# Patient Record
Sex: MALE | Race: WHITE | NOT HISPANIC OR LATINO | Employment: FULL TIME | URBAN - METROPOLITAN AREA
[De-identification: names, ages, dates, MRNs, and addresses within clinical notes are randomized per-mention and may not be internally consistent; named-entity substitution may affect disease eponyms.]

---

## 2020-10-15 ENCOUNTER — TRANSFERRED RECORDS (OUTPATIENT)
Dept: HEALTH INFORMATION MANAGEMENT | Facility: CLINIC | Age: 60
End: 2020-10-15

## 2020-12-08 ENCOUNTER — TRANSFERRED RECORDS (OUTPATIENT)
Dept: HEALTH INFORMATION MANAGEMENT | Facility: CLINIC | Age: 60
End: 2020-12-08

## 2020-12-31 ENCOUNTER — TRANSFERRED RECORDS (OUTPATIENT)
Dept: HEALTH INFORMATION MANAGEMENT | Facility: CLINIC | Age: 60
End: 2020-12-31

## 2021-01-04 ENCOUNTER — TRANSFERRED RECORDS (OUTPATIENT)
Dept: HEALTH INFORMATION MANAGEMENT | Facility: CLINIC | Age: 61
End: 2021-01-04

## 2022-05-26 ENCOUNTER — TRANSFERRED RECORDS (OUTPATIENT)
Dept: HEALTH INFORMATION MANAGEMENT | Facility: CLINIC | Age: 62
End: 2022-05-26

## 2022-05-27 ENCOUNTER — TRANSFERRED RECORDS (OUTPATIENT)
Dept: HEALTH INFORMATION MANAGEMENT | Facility: CLINIC | Age: 62
End: 2022-05-27

## 2022-09-07 ENCOUNTER — TRANSFERRED RECORDS (OUTPATIENT)
Dept: HEALTH INFORMATION MANAGEMENT | Facility: CLINIC | Age: 62
End: 2022-09-07

## 2022-11-11 ENCOUNTER — TRANSFERRED RECORDS (OUTPATIENT)
Dept: HEALTH INFORMATION MANAGEMENT | Facility: CLINIC | Age: 62
End: 2022-11-11

## 2023-01-03 ENCOUNTER — TRANSFERRED RECORDS (OUTPATIENT)
Dept: HEALTH INFORMATION MANAGEMENT | Facility: CLINIC | Age: 63
End: 2023-01-03

## 2023-01-07 ENCOUNTER — TRANSFERRED RECORDS (OUTPATIENT)
Dept: HEALTH INFORMATION MANAGEMENT | Facility: CLINIC | Age: 63
End: 2023-01-07

## 2023-01-11 ENCOUNTER — TRANSFERRED RECORDS (OUTPATIENT)
Dept: HEALTH INFORMATION MANAGEMENT | Facility: CLINIC | Age: 63
End: 2023-01-11

## 2023-06-12 ENCOUNTER — TRANSFERRED RECORDS (OUTPATIENT)
Dept: HEALTH INFORMATION MANAGEMENT | Facility: CLINIC | Age: 63
End: 2023-06-12
Payer: COMMERCIAL

## 2023-07-23 ENCOUNTER — HEALTH MAINTENANCE LETTER (OUTPATIENT)
Age: 63
End: 2023-07-23

## 2023-10-12 ENCOUNTER — TELEPHONE (OUTPATIENT)
Dept: TRANSPLANT | Facility: CLINIC | Age: 63
End: 2023-10-12
Payer: COMMERCIAL

## 2023-10-12 DIAGNOSIS — N18.6 END STAGE RENAL DISEASE (H): ICD-10-CM

## 2023-10-12 DIAGNOSIS — I10 ESSENTIAL HYPERTENSION: ICD-10-CM

## 2023-10-12 DIAGNOSIS — Z01.818 PRE-TRANSPLANT EVALUATION FOR KIDNEY TRANSPLANT: Primary | ICD-10-CM

## 2023-10-12 DIAGNOSIS — E78.5 HYPERLIPIDEMIA: ICD-10-CM

## 2023-10-12 DIAGNOSIS — Z76.82 ORGAN TRANSPLANT CANDIDATE: ICD-10-CM

## 2023-10-12 DIAGNOSIS — N02.B9 IGA NEPHROPATHY: ICD-10-CM

## 2023-10-12 DIAGNOSIS — I25.10 CARDIOVASCULAR DISEASE: ICD-10-CM

## 2023-10-12 DIAGNOSIS — K76.89 LIVER DYSFUNCTION: ICD-10-CM

## 2023-10-12 NOTE — TELEPHONE ENCOUNTER
Patient Call: General    Reason for call: patient called stated he is down to 270 LB and also would like to see where he is at with moving to the next step. Caller has dialysis MWF early morning a good call return would be after 10:30 am.    Call back needed? Yes    Return Call Needed  Same as documented in contacts section  When to return call?: Greater than one day: Route standard priority

## 2023-11-13 NOTE — TELEPHONE ENCOUNTER
Contacted patient and introduced myself as their Transplant Coordinator, also introduced the role of the Transplant Coordinator in the transplant process.  Explained the purpose of this call including reviewing next steps and answering questions.    Confirmed Referring Provider, Dialysis Center, and Primary Care Physician. Notified patient of the importance of continued communication with referring providers and primary care physicians.    Reviewed components of transplant evaluation process including necessary appointments, tests, and procedures.    Answered questions for patient regarding evaluation, provided my name and contact information and requested they call with any additional questions.    Determined that patient would like additional information regarding transplant by:     Drop Down choices: Mail, Email, MyChart, Phone Call   Encourage MyChart   Notified patients that they will hear from a Transplant  to schedule evaluation.      Reviewed pt's chart for pre-kidney transplant evaluation planning. Pt lives in [place], [place]. Pt had severe ATN d/t sepsis from Cellulitis 9/2021 and was on HD for 3 months, then recovered, now has ESKD w/ 2016 renal biopsy showing: Mild mesangioproliferative IgA nephropathy. Acute tubular necrosis. Mild chronic tubulointerstitial nephritis. IgA Nephropathy Oxford Classification: M0, E0, S0, T0. Pt has been on dialysis since 1/2023 and follows w/ Dr. Anderson Davey.  Other hx includes GREG, DVT of LLE first noted 2014, had an IVC filter placed that has since been removed (DVT noted to be chronic on imaging 1/2023), pt is now on Eliquis, (transitioned off Coumadin d/t oozing internal hemorrhoids), portal HTN on Nadolol, portal vein thrombosis (s/p TIPS 2014), splenic artery embolization 2014, and gastric varices s/p banding (last EGD 1/7/23 unremarkable). Heart hx Cardiomyopathy noted in 2014 -last echo done in 2014.  BMI 41, pt continues to work on weight loss - discussed  BMI requirements.  Colonoscopy UTD.  Dental: UTD.  Pt is not a smoker, denies concerns w/ alcohol and  recreational drugs. Pt is independent w/ ADLs.  Pt lives w/ wife and has good support following transplant. Pt has potential living donors.     I also introduced [employer] and asked pt to create an account and view pre-kidney transplant videos for review with me following evaluation. Confirmed STD 2/8/24. Informed pt they will hear from scheduling to arrange the evaluation. Smartset orders entered.

## 2024-02-01 ENCOUNTER — TELEPHONE (OUTPATIENT)
Dept: TRANSPLANT | Facility: CLINIC | Age: 64
End: 2024-02-01
Payer: COMMERCIAL

## 2024-02-01 NOTE — TELEPHONE ENCOUNTER
Returned call to pt, he stated he had technical issues w/ the transplant class. I will email him the links to watch the videos independently and encouraged him to take a look prior to his PKE appointment, he agreed to do so.

## 2024-02-01 NOTE — TELEPHONE ENCOUNTER
Patient called tried to get into the virtual group meeting and was not able and missed it wondering what to do and how to get into the next one before his evaluation next week.

## 2024-02-01 NOTE — TELEPHONE ENCOUNTER
Called patient to try to reschedule him for class. Unable to reach him but did leave a VM letting him know to call Gracie tomorrow to get re scheduled for the virtual transplant class.

## 2024-02-05 ENCOUNTER — TELEPHONE (OUTPATIENT)
Dept: TRANSPLANT | Facility: CLINIC | Age: 64
End: 2024-02-05
Payer: COMMERCIAL

## 2024-02-05 NOTE — TELEPHONE ENCOUNTER
VM message left reminding patient of upcoming PKE appointments at [facility] on 2/8/24 starting at 0730. Instructed patient may eat breakfast and take regularly scheduled medications.  Contact information given for any further questions/concerns/need to reschedule.

## 2024-02-06 NOTE — PROGRESS NOTES
[facility] SOLID ORGAN TRANSPLANT  OUTPATIENT MNT: KIDNEY TRANSPLANT EVALUATION    Current BMI: 41 (HT 67 in,  lbs/119 kg)  BMI guideline for kidney transplant up to a BMI of 40 / per surgeon discretion     Frailty Assessment-- Not Frail (1/5 points)- low activity level     Reference:  Score of 0-2 = Not Frail  Score of 3-5 = Frail      TIME SPENT: 30 minutes  VISIT TYPE: Initial   REFERRING PHYSICIAN: Darian   PT ACCOMPANIED BY: his wife     History of previous txp: none   Dialysis: 1/2023 early AM shift     NUTRITION ASSESSMENT  - Appetite: overall ok, but less the past few days (pt reports it fluctuates based on weather)  - Food allergies/intolerances: none   - Meal prep & grocery shopping: pt or his wife   - Previous RD education: yes  - Issues chewing or swallowing: no   - N/V/D/C: no  - Food access concerns: not asked     Vitamins, Supplements, Pertinent Meds: dialyvite, iron, Phoslo (takes w/ food)  Herbal Medicines/Supplements: none   Protein Supplement: none     Edema: wears compression socks, so fluid well controlled (mild MARJORIE before starting dialysis)    Weight hx:   - pt has lost ~70 lbs in the past year- pt attributes this to following the cabbage soup diet and eating more salads now that he switched from coumadin to eliquis; likely weight loss in part d/t diuresis from dialysis start   - feels his weight continues to trend down     6/2022- 332 lbs (BMI 52)  9/2022- 308 lbs (BMI 46.8)  1/2023- 302 lbs (BMI 45.9)    PHYSICAL ACTIVITY   Moves around/walks at work   Has total gym at home- but does not use routinely  Does own ADLs- energy level good  Can walk 1 block    DIET RECALL  Breakfast Light BF- PB s/w (1/2) after HD or on break will have oatmeal w/ raisins, apples (meal preps)   Lunch Fruit cup, boiled eggs, some deli turkey s/w, leftovers       Dinner Tater tot hot dish + salad; egg salad s/w    Snacks Unsalted peanuts    Beverages Water, coffee (black), German Garza (if out to  eat- or lite version at home), crystal light tea sometimes, occasional juice, occasional Diet pop (1/week or less), 8 oz milk/day (white or chocolate)   Alcohol None    Dining out 1x/week or less      LABS  1/15 Phos 4.4 K 4.5     NUTRITION DIAGNOSIS   Obesity r/t positive energy balance and inadequate physical activity AEB BMI>40.     NUTRITION INTERVENTION   Nutrition education provided:  Discussed sodium intake (low sodium foods and drinks, seasoning food without salt and tips for low sodium diet).  Reviewed monitoring Na intake in foods that are processed and how this may impact cramping at dialysis, etc. Reviewed wnl K/Phos levels. Pt is wondering if he can drink Ensure Plus (received case from dad)- yes, this would be appropriate with wnl K/Phos levels. Advise only 3x/week due to calorie content. Would recommend using in place of sandwich after dialysis tx.   Reviewed likelihood he will need to lose a little more weight; TBD by surgeon.     Reviewed post txp diet guidelines in brief (will review in further detail post txp):  (1) Review of proper food safety measures d/t immunosuppressant therapy post-op and increased risk for food-borne illness    (2) Avoid the following post txp d/t risk for rejection, unknown effects on the organs, and/or potential interactions with immunosuppressants:  - Herbal, Chinese, holistic, chiropractic, natural, alternative medicines and supplements  - Detoxes and cleanses  - Weight loss pills  - Protein powders or other products with extracts or herbs (ie green tea extract)    (3) Med regimen and possible side effects    Patient Understanding: Pt verbalized understanding of education provided.  Expected Engagement: Good  Follow-Up Plans: PRN     NUTRITION GOALS    BMI/weight loss goals per surgeon    Jordyn Saldivar, RD, LD, CCTD

## 2024-02-07 LAB
ABO/RH(D): NORMAL
ANTIBODY SCREEN: NEGATIVE
SPECIMEN EXPIRATION DATE: NORMAL

## 2024-02-08 ENCOUNTER — LAB (OUTPATIENT)
Dept: LAB | Facility: CLINIC | Age: 64
End: 2024-02-08
Attending: NURSE PRACTITIONER
Payer: COMMERCIAL

## 2024-02-08 ENCOUNTER — ALLIED HEALTH/NURSE VISIT (OUTPATIENT)
Dept: TRANSPLANT | Facility: CLINIC | Age: 64
End: 2024-02-08
Attending: NURSE PRACTITIONER
Payer: COMMERCIAL

## 2024-02-08 ENCOUNTER — ANCILLARY PROCEDURE (OUTPATIENT)
Dept: CARDIOLOGY | Facility: CLINIC | Age: 64
End: 2024-02-08
Attending: NURSE PRACTITIONER
Payer: COMMERCIAL

## 2024-02-08 ENCOUNTER — ANCILLARY PROCEDURE (OUTPATIENT)
Dept: GENERAL RADIOLOGY | Facility: CLINIC | Age: 64
End: 2024-02-08
Attending: NURSE PRACTITIONER
Payer: COMMERCIAL

## 2024-02-08 ENCOUNTER — DOCUMENTATION ONLY (OUTPATIENT)
Dept: TRANSPLANT | Facility: CLINIC | Age: 64
End: 2024-02-08

## 2024-02-08 VITALS
HEART RATE: 90 BPM | BODY MASS INDEX: 41.14 KG/M2 | DIASTOLIC BLOOD PRESSURE: 62 MMHG | HEIGHT: 67 IN | WEIGHT: 262.1 LBS | OXYGEN SATURATION: 97 % | SYSTOLIC BLOOD PRESSURE: 166 MMHG

## 2024-02-08 DIAGNOSIS — E78.5 HYPERLIPIDEMIA: ICD-10-CM

## 2024-02-08 DIAGNOSIS — N02.B9 IGA NEPHROPATHY: ICD-10-CM

## 2024-02-08 DIAGNOSIS — K76.89 LIVER DYSFUNCTION: ICD-10-CM

## 2024-02-08 DIAGNOSIS — N18.6 END STAGE RENAL DISEASE (H): ICD-10-CM

## 2024-02-08 DIAGNOSIS — I25.10 CARDIOVASCULAR DISEASE: ICD-10-CM

## 2024-02-08 DIAGNOSIS — I10 ESSENTIAL HYPERTENSION: ICD-10-CM

## 2024-02-08 DIAGNOSIS — R31.29 MICROSCOPIC HEMATURIA: ICD-10-CM

## 2024-02-08 DIAGNOSIS — Z76.82 ORGAN TRANSPLANT CANDIDATE: ICD-10-CM

## 2024-02-08 DIAGNOSIS — Z01.818 PRE-TRANSPLANT EVALUATION FOR KIDNEY TRANSPLANT: ICD-10-CM

## 2024-02-08 DIAGNOSIS — Z01.818 PRE-TRANSPLANT EVALUATION FOR KIDNEY TRANSPLANT: Primary | ICD-10-CM

## 2024-02-08 LAB
A1 AG RBC QL: POSITIVE
ABO/RH(D): NORMAL
ALBUMIN SERPL BCG-MCNC: 4.5 G/DL (ref 3.5–5.2)
ALBUMIN UR-MCNC: 20 MG/DL
ALP SERPL-CCNC: 71 U/L (ref 40–150)
ALT SERPL W P-5'-P-CCNC: 16 U/L (ref 0–70)
ANION GAP SERPL CALCULATED.3IONS-SCNC: 16 MMOL/L (ref 7–15)
ANTIBODY TITER IGM SCREEN: NEGATIVE
APPEARANCE UR: ABNORMAL
APTT PPP: 38 SECONDS (ref 22–38)
AST SERPL W P-5'-P-CCNC: 28 U/L (ref 0–45)
B IGG TITR SERPL: 16 {TITER}
B IGM TITR SERPL: 8 {TITER}
BASOPHILS # BLD AUTO: 0 10E3/UL (ref 0–0.2)
BASOPHILS NFR BLD AUTO: 1 %
BILIRUB SERPL-MCNC: 0.9 MG/DL
BILIRUB UR QL STRIP: NEGATIVE
BUN SERPL-MCNC: 38.7 MG/DL (ref 8–23)
CALCIUM SERPL-MCNC: 10 MG/DL (ref 8.8–10.2)
CHLORIDE SERPL-SCNC: 96 MMOL/L (ref 98–107)
CMV IGG SERPL IA-ACNC: >10 U/ML
CMV IGG SERPL IA-ACNC: ABNORMAL
COLOR UR AUTO: YELLOW
CREAT SERPL-MCNC: 4.36 MG/DL (ref 0.67–1.17)
DEPRECATED HCO3 PLAS-SCNC: 26 MMOL/L (ref 22–29)
EBV VCA IGG SER IA-ACNC: >750 U/ML
EBV VCA IGG SER IA-ACNC: POSITIVE
EGFRCR SERPLBLD CKD-EPI 2021: 15 ML/MIN/1.73M2
EOSINOPHIL # BLD AUTO: 0.1 10E3/UL (ref 0–0.7)
EOSINOPHIL NFR BLD AUTO: 1 %
ERYTHROCYTE [DISTWIDTH] IN BLOOD BY AUTOMATED COUNT: 13.6 % (ref 10–15)
FACTOR 2 INTERPRETATION: NORMAL
FACTOR V INTERPRETATION: NORMAL
GLUCOSE SERPL-MCNC: 137 MG/DL (ref 70–99)
GLUCOSE UR STRIP-MCNC: NEGATIVE MG/DL
HBA1C MFR BLD: 6 %
HCT VFR BLD AUTO: 39.8 % (ref 40–53)
HGB BLD-MCNC: 13.8 G/DL (ref 13.3–17.7)
HGB UR QL STRIP: ABNORMAL
HYALINE CASTS: 10 /LPF
IMM GRANULOCYTES # BLD: 0 10E3/UL
IMM GRANULOCYTES NFR BLD: 0 %
INR PPP: 1.32 (ref 0.85–1.15)
KETONES UR STRIP-MCNC: NEGATIVE MG/DL
LAB DIRECTOR COMMENTS: NORMAL
LAB DIRECTOR DISCLAIMER: NORMAL
LAB DIRECTOR INTERPRETATION: NORMAL
LAB DIRECTOR METHODOLOGY: NORMAL
LAB DIRECTOR RESULTS: NORMAL
LEUKOCYTE ESTERASE UR QL STRIP: NEGATIVE
LVEF ECHO: NORMAL
LYMPHOCYTES # BLD AUTO: 1 10E3/UL (ref 0.8–5.3)
LYMPHOCYTES NFR BLD AUTO: 19 %
MCH RBC QN AUTO: 31.2 PG (ref 26.5–33)
MCHC RBC AUTO-ENTMCNC: 34.7 G/DL (ref 31.5–36.5)
MCV RBC AUTO: 90 FL (ref 78–100)
MONOCYTES # BLD AUTO: 0.5 10E3/UL (ref 0–1.3)
MONOCYTES NFR BLD AUTO: 10 %
MUCOUS THREADS #/AREA URNS LPF: PRESENT /LPF
NEUTROPHILS # BLD AUTO: 3.5 10E3/UL (ref 1.6–8.3)
NEUTROPHILS NFR BLD AUTO: 69 %
NITRATE UR QL: NEGATIVE
NRBC # BLD AUTO: 0 10E3/UL
NRBC BLD AUTO-RTO: 0 /100
PH UR STRIP: 5 [PH] (ref 5–7)
PLATELET # BLD AUTO: 123 10E3/UL (ref 150–450)
POTASSIUM SERPL-SCNC: 3.6 MMOL/L (ref 3.4–5.3)
PROT SERPL-MCNC: 8.2 G/DL (ref 6.4–8.3)
PSA SERPL DL<=0.01 NG/ML-MCNC: 1.14 NG/ML (ref 0–4.5)
RBC # BLD AUTO: 4.43 10E6/UL (ref 4.4–5.9)
RBC URINE: >182 /HPF
SODIUM SERPL-SCNC: 138 MMOL/L (ref 135–145)
SP GR UR STRIP: 1.02 (ref 1–1.03)
SPECIMEN DESCRIPTION: NORMAL
SPECIMEN EXPIRATION DATE: NORMAL
SQUAMOUS EPITHELIAL: 1 /HPF
T PALLIDUM AB SER QL: NONREACTIVE
UROBILINOGEN UR STRIP-MCNC: NORMAL MG/DL
VZV IGG SER QL IA: 3576 INDEX
VZV IGG SER QL IA: POSITIVE
WBC # BLD AUTO: 5.1 10E3/UL (ref 4–11)
WBC URINE: 6 /HPF

## 2024-02-08 PROCEDURE — 86147 CARDIOLIPIN ANTIBODY EA IG: CPT | Performed by: PHYSICIAN ASSISTANT

## 2024-02-08 PROCEDURE — 86905 BLOOD TYPING RBC ANTIGENS: CPT | Performed by: PHYSICIAN ASSISTANT

## 2024-02-08 PROCEDURE — 86787 VARICELLA-ZOSTER ANTIBODY: CPT | Performed by: PHYSICIAN ASSISTANT

## 2024-02-08 PROCEDURE — G0103 PSA SCREENING: HCPCS | Performed by: PATHOLOGY

## 2024-02-08 PROCEDURE — 86481 TB AG RESPONSE T-CELL SUSP: CPT | Performed by: PHYSICIAN ASSISTANT

## 2024-02-08 PROCEDURE — 86832 HLA CLASS I HIGH DEFIN QUAL: CPT | Performed by: PHYSICIAN ASSISTANT

## 2024-02-08 PROCEDURE — 81378 HLA I & II TYPING HR: CPT | Performed by: PHYSICIAN ASSISTANT

## 2024-02-08 PROCEDURE — 85610 PROTHROMBIN TIME: CPT | Performed by: PHYSICIAN ASSISTANT

## 2024-02-08 PROCEDURE — 86704 HEP B CORE ANTIBODY TOTAL: CPT | Performed by: PHYSICIAN ASSISTANT

## 2024-02-08 PROCEDURE — 86886 COOMBS TEST INDIRECT TITER: CPT | Performed by: PHYSICIAN ASSISTANT

## 2024-02-08 PROCEDURE — 86665 EPSTEIN-BARR CAPSID VCA: CPT | Performed by: PHYSICIAN ASSISTANT

## 2024-02-08 PROCEDURE — 85025 COMPLETE CBC W/AUTO DIFF WBC: CPT | Performed by: PATHOLOGY

## 2024-02-08 PROCEDURE — 99213 OFFICE O/P EST LOW 20 MIN: CPT | Performed by: TRANSPLANT SURGERY

## 2024-02-08 PROCEDURE — 86706 HEP B SURFACE ANTIBODY: CPT | Performed by: PHYSICIAN ASSISTANT

## 2024-02-08 PROCEDURE — 93306 TTE W/DOPPLER COMPLETE: CPT | Performed by: INTERNAL MEDICINE

## 2024-02-08 PROCEDURE — 86644 CMV ANTIBODY: CPT | Performed by: PHYSICIAN ASSISTANT

## 2024-02-08 PROCEDURE — 36415 COLL VENOUS BLD VENIPUNCTURE: CPT | Performed by: PATHOLOGY

## 2024-02-08 PROCEDURE — 87086 URINE CULTURE/COLONY COUNT: CPT | Performed by: PHYSICIAN ASSISTANT

## 2024-02-08 PROCEDURE — 81001 URINALYSIS AUTO W/SCOPE: CPT | Performed by: PATHOLOGY

## 2024-02-08 PROCEDURE — 80053 COMPREHEN METABOLIC PANEL: CPT | Performed by: PATHOLOGY

## 2024-02-08 PROCEDURE — 99000 SPECIMEN HANDLING OFFICE-LAB: CPT | Performed by: PATHOLOGY

## 2024-02-08 PROCEDURE — 99204 OFFICE O/P NEW MOD 45 MIN: CPT | Performed by: TRANSPLANT SURGERY

## 2024-02-08 PROCEDURE — 86803 HEPATITIS C AB TEST: CPT | Performed by: PHYSICIAN ASSISTANT

## 2024-02-08 PROCEDURE — 71046 X-RAY EXAM CHEST 2 VIEWS: CPT | Mod: GC | Performed by: RADIOLOGY

## 2024-02-08 PROCEDURE — 85670 THROMBIN TIME PLASMA: CPT | Performed by: PHYSICIAN ASSISTANT

## 2024-02-08 PROCEDURE — 86900 BLOOD TYPING SEROLOGIC ABO: CPT | Performed by: PHYSICIAN ASSISTANT

## 2024-02-08 PROCEDURE — 87340 HEPATITIS B SURFACE AG IA: CPT | Performed by: PHYSICIAN ASSISTANT

## 2024-02-08 PROCEDURE — 86833 HLA CLASS II HIGH DEFIN QUAL: CPT | Performed by: PHYSICIAN ASSISTANT

## 2024-02-08 PROCEDURE — 86780 TREPONEMA PALLIDUM: CPT | Performed by: PHYSICIAN ASSISTANT

## 2024-02-08 PROCEDURE — G0452 MOLECULAR PATHOLOGY INTERPR: HCPCS | Mod: 26 | Performed by: PATHOLOGY

## 2024-02-08 PROCEDURE — 81240 F2 GENE: CPT | Performed by: PHYSICIAN ASSISTANT

## 2024-02-08 PROCEDURE — 85730 THROMBOPLASTIN TIME PARTIAL: CPT | Performed by: PHYSICIAN ASSISTANT

## 2024-02-08 PROCEDURE — 83036 HEMOGLOBIN GLYCOSYLATED A1C: CPT | Performed by: PHYSICIAN ASSISTANT

## 2024-02-08 RX ORDER — APIXABAN 2.5 MG/1
1 TABLET, FILM COATED ORAL 2 TIMES DAILY
COMMUNITY
Start: 2023-01-13

## 2024-02-08 RX ORDER — CALCIUM ACETATE 667 MG/1
667 CAPSULE ORAL
COMMUNITY
Start: 2023-01-03

## 2024-02-08 RX ORDER — ASCORBIC ACID 500 MG
500 TABLET ORAL DAILY
COMMUNITY
End: 2024-02-20

## 2024-02-08 RX ORDER — ASCORBIC ACID, THIAMINE, RIBOFLAVIN, NIACINAMIDE, PYRIDOXINE, FOLIC ACID, COBALAMIN, BIOTIN, PANTOTHENIC ACID, ZINC 100; 1.5; 1.7; 20; 10; 1; 6; 300; 10; 5 MG/1; MG/1; MG/1; MG/1; MG/1; MG/1; UG/1; UG/1; MG/1; MG/1
1 TABLET, COATED ORAL EVERY MORNING
COMMUNITY
Start: 2024-01-22

## 2024-02-08 NOTE — LETTER
2/8/2024         RE: Bao Keenan  [address] [address] [address]        Dear Colleague,    Thank you for referring your patient, Bao Keenan, to the [facility] TRANSPLANT [facility]. Please see a copy of my visit note below.    [facility] SOLID ORGAN TRANSPLANT  OUTPATIENT MNT: KIDNEY TRANSPLANT EVALUATION    Current BMI: 41 (HT 67 in,  lbs/119 kg)  BMI guideline for kidney transplant up to a BMI of 40 / per surgeon discretion     Frailty Assessment-- Not Frail (1/5 points)- low activity level     Reference:  Score of 0-2 = Not Frail  Score of 3-5 = Frail      TIME SPENT: 30 minutes  VISIT TYPE: Initial   REFERRING PHYSICIAN: Darian   PT ACCOMPANIED BY: his wife     History of previous txp: none   Dialysis: 1/2023 early AM shift     NUTRITION ASSESSMENT  - Appetite: overall ok, but less the past few days (pt reports it fluctuates based on weather)  - Food allergies/intolerances: none   - Meal prep & grocery shopping: pt or his wife   - Previous RD education: yes  - Issues chewing or swallowing: no   - N/V/D/C: no  - Food access concerns: not asked     Vitamins, Supplements, Pertinent Meds: dialyvite, iron, Phoslo (takes w/ food)  Herbal Medicines/Supplements: none   Protein Supplement: none     Edema: wears compression socks, so fluid well controlled (mild MARJORIE before starting dialysis)    Weight hx:   - pt has lost ~70 lbs in the past year- pt attributes this to following the cabbage soup diet and eating more salads now that he switched from coumadin to eliquis; likely weight loss in part d/t diuresis from dialysis start   - feels his weight continues to trend down     6/2022- 332 lbs (BMI 52)  9/2022- 308 lbs (BMI 46.8)  1/2023- 302 lbs (BMI 45.9)    PHYSICAL ACTIVITY   Moves around/walks at work   Has total gym at home- but does not use routinely  Does own ADLs- energy level good  Can walk 1 block    DIET RECALL  Breakfast Light BF- PB s/w (1/2) after HD or on break will  have oatmeal w/ raisins, apples (meal preps)   Lunch Fruit cup, boiled eggs, some deli turkey s/w, leftovers       Dinner Tater tot hot dish + salad; egg salad s/w    Snacks Unsalted peanuts    Beverages Water, coffee (black), German Garza (if out to eat- or lite version at home), crystal light tea sometimes, occasional juice, occasional Diet pop (1/week or less), 8 oz milk/day (white or chocolate)   Alcohol None    Dining out 1x/week or less      LABS  1/15 Phos 4.4 K 4.5     NUTRITION DIAGNOSIS   Obesity r/t positive energy balance and inadequate physical activity AEB BMI>40.     NUTRITION INTERVENTION   Nutrition education provided:  Discussed sodium intake (low sodium foods and drinks, seasoning food without salt and tips for low sodium diet).  Reviewed monitoring Na intake in foods that are processed and how this may impact cramping at dialysis, etc. Reviewed wnl K/Phos levels. Pt is wondering if he can drink Ensure Plus (received case from dad)- yes, this would be appropriate with wnl K/Phos levels. Advise only 3x/week due to calorie content. Would recommend using in place of sandwich after dialysis tx.   Reviewed likelihood he will need to lose a little more weight; TBD by surgeon.     Reviewed post txp diet guidelines in brief (will review in further detail post txp):  (1) Review of proper food safety measures d/t immunosuppressant therapy post-op and increased risk for food-borne illness    (2) Avoid the following post txp d/t risk for rejection, unknown effects on the organs, and/or potential interactions with immunosuppressants:  - Herbal, Chinese, holistic, chiropractic, natural, alternative medicines and supplements  - Detoxes and cleanses  - Weight loss pills  - Protein powders or other products with extracts or herbs (ie green tea extract)    (3) Med regimen and possible side effects    Patient Understanding: Pt verbalized understanding of education provided.  Expected Engagement: Good  Follow-Up  Plans: PRN     NUTRITION GOALS    BMI/weight loss goals per surgeon    Jordyn Saldivar RD, LD, CCTD                                      Again, thank you for allowing me to participate in the care of your patient.        Sincerely,        Jordyn Saldivar RD

## 2024-02-08 NOTE — PROGRESS NOTES
"Patient has end-stage renal disease and here to assess candidacy for renal transplantation.    Please see my nephrology colleague notes.    Briefly:  Patient has end-stage renal disease is currently on dialysis.  Over the last year he has lost about 70 pounds.  He has also lost some fluid due to dialysis.  He switched over from warfarin to Eliquis for his hypercoagulability and his diet has improved and is now taking more salads.  He is otherwise quite functional.  He is working 40 hours a week and dialyzing 3 times a week.  No recent chest pain heart attack or any other symptoms.    BP (!) 166/62   Pulse 90   Ht 1.702 m (5' 7\")   Wt 118.9 kg (262 lb 1.6 oz)   SpO2 97%   BMI 41.05 kg/m      Examination of the abdomen: He has got significant pannus present.  The right femoral pulses weakly felt.    Clinical impression: End-stage renal disease.    Recommendations:    #1.  He needs a hepatology consultation.  In the past he has had portal vein thrombosis with varices and looks like he met with interventional radiology but I cannot find the notes as to the current situation.  Would for start with a ultrasound Doppler to evaluate the patency of the portal vein and then a clearance from hepatology as to the etiology of the portal vein thrombosis.  #2.  He should continue to lose weight.  At this time weight is not a contraindication and he can move ahead with kidney transplant if everything else works out.  #3.  He needs CT scan of the abdomen without any IV contrast to assess for any calcifications.    I had a long discussion with the patient regarding kidney transplantation in general and the following points in particular:    Survival statistics at one, five and ten years following kidney transplantation both for living-related and cadaveric allografts.  The kidney transplant selection committee process.  The complications following kidney transplant that included but were not limited to wound infection, vascular " complications, ureter leak, ureteral strictures, and bowel obstruction  The need for lifelong immunosuppressive therapy and the side effects of these medications including specifically the risk of cancer and lymphoma.  The waiting list time of approximately a year or more for cadaveric transplants.  The statistical superiority of a living-related donor and the compelling reasons to encourage that therapy.    The patient understands these issues quite well and is eager to proceed with our recommendation and with transplantation.

## 2024-02-08 NOTE — LETTER
2/8/2024         RE: Bao Keenan  [address] [address] [facility] [facility] [address]        Dear Colleague,    Thank you for referring your patient, Bao Keenan, to the [facility] TRANSPLANT [facility]. Please see a copy of my visit note below.    TRANSPLANT NEPHROLOGY RECIPIENT EVALUATION NOTE    Assessment and Plan:  # Kidney Transplant Evaluation: Patient is a fair candidate overall. Benefits of a living donor transplant were discussed.    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    # Protein C Deficiency with Recurrent DVT: multiple episodes, including portal vein thrombosis, as below. He has been anticoagulated since about 2009, history of IVC filter, since removed. On Eliquis currently at patient's request, but was previously on long-term coumadin and would be open to going back on it if needed.    # Portal HTN/Gastropathy (on most recent 2022 EGD)  # Portal Vein Thrombosis  # Esophageal Varices (s/p banding 2014)    2/2 protein C deficiency  - Partial splenic artery embolization, venous embolization of gastric varices and splenorenal shunts. Platelets ~120k. Needs hepatology.     # Microscopic Hematuria: previous UAs with ~6-10 RBCs, but now > 100. will refer to urology.     # Prediabetes: A1c 6%. Follow up with PCP.     # Cardiac Risk: history of cardiomyopathy of unclear etiology with EF 40-45% in 2014 (since recovered) and 1st degree AV block. Needs risk assessment.      # PAD Screening: per surgery    # Obesity: BMI 41. Has lost about 70 lbs in the last year. Surgeon input regarding appropriateness of body habitus for transplant.    # Normal Hemoglobin: will get renal US to assess for mass.     # Health Maintenance: Colonoscopy: Up to date and Dental: Up to date    Recommendations:  - Review Eliquis. May need to transition back to coumadin for transplant   - Referral to hepatology  - Cardiac risk assessment  - PAD imaging per surgery  -  Review BMI  - Renal US    - Discussed the risks and benefits of a transplant, including the risk of surgery and immunosuppression medications.  Patient's overall evaluation will be discussed in the Transplant Program's regular meeting with a final recommendation on the patients suitability for transplant to be made at that time.    Evaluation:  Bao Keenan was seen in consultation at the request of Dr. Hussain Farmer for evaluation as a potential kidney transplant recipient.    Reason for Visit:  Bao Keenan is a 63 year old male with ESKD from IgA nephropathy, who presents for kidney transplant evaluation.    History of Present Illness:         Kidney Disease Hx:        Native kidney biopsy 2016 with IgA nephropathy ([id number]), creatinine mid-to-high 1's mg/dl. Serologic evaluation at that time in 2016 was negative (WESTLEY, ANCA, hepatitis C antibody, hep B surface antigen, and SPEP). Severe FELICIANO 2020 from sepsis 2/2 cellulitis, required HD 10/8/2020-1/11/2021. FELICIANO May 2022, severe near life threatening bleed with EGD showing portal gastropathy. HD started Jan 2023 during admit for GI bleed. HD is going OK. Still has UO. LUE AVF.        Primary Nephrologist: Dr. Rodriguez       H/o Kidney Stones: No       H/o Recurrent/Frequent UTI: No         Diabetic Hx: Borderline            Cardiac/Vascular Disease Risk Factors:        Known CAD: No       Known PAD/Caludication Symptoms: No       Known Heart Failure: Decreased LVEF       Arrhythmia: No       Pulmonary Hypertension: No       Valvular Disease: No       Other: None         Viral Serology Status       CMV IgG Antibody: Positive       EBV IgG Antibody: Positive         Volume Status/Weight:        Volume status: Euvolemic       BMI: 41         Functional Capacity/Frailty:        Works full time for medical device company. Not too much exercise but no limitations. Can walk at least a block. No chest pain or SOB.    Fatigue/Decreased Energy: [x] No [] Yes     Chest Pain or SOB with Exertion: [x] No [] Yes    Significant Weight Change: [x] No [] Yes    Nausea, Vomiting or Diarrhea: [x] No [] Yes    Fever, Sweats or Chills:  [x] No [] Yes    Leg Swelling [x] No [] Yes        Allergy Testing Questions:   Medication that caused a reaction None   Antibiotics used that didn't give an allergic reaction?  None    Potential Living Kidney Donors: Yes    Review of Systems:  A comprehensive review of systems was obtained and negative, except as noted in the HPI or PMH.    Past Medical History:   Medical record was reviewed and PMH was discussed with patient and noted below.  Past Medical History:   Diagnosis Date     Anemia      Antiplatelet or antithrombotic long-term use      Arthritis     Right Knee     Coagulation disorder (H24)     Protein C deficiency     Gastro-oesophageal reflux disease      History of blood transfusion      Hypertension      Liver disease     non alcoholic fatty liver     Portal hypertension (H)      Renal disease     CKD     Thrombosis of leg     +DVT       Past Social History:   Past Surgical History:   Procedure Laterality Date     BIOPSY      2014     COLONOSCOPY       ENT SURGERY      tonsils     EYE SURGERY      lasik     IR IVC FILTER PLACEMENT       IR IVC FILTER REMOVAL  12/17/2014     TIPS PROCEDURE       Personal history of bleeding or anesthesia problems: No    Family History:  No family history on file.    Personal History:   Social History     Socioeconomic History     Marital status:      Spouse name: Not on file     Number of children: Not on file     Years of education: Not on file     Highest education level: Not on file   Occupational History     Not on file   Tobacco Use     Smoking status: Never     Smokeless tobacco: Never   Substance and Sexual Activity     Alcohol use: No     Drug use: No     Sexual activity: Not on file   Other Topics Concern     Parent/sibling w/ CABG, MI or angioplasty before 65F 55M? Not Asked   Social  "History Narrative     Not on file     Social Determinants of Health     Financial Resource Strain: Not on file   Food Insecurity: Not on file   Transportation Needs: Not on file   Physical Activity: Not on file   Stress: Not on file   Social Connections: Not on file   Interpersonal Safety: Not on file   Housing Stability: Not on file       Allergies:  No Known Allergies    Medications:  Current Outpatient Medications   Medication Sig     B Complex-C-Zn-Folic Acid (DIALYVITE/ZINC) TABS Take 1 tablet by mouth every morning     calcium acetate (PHOSLO) 667 MG CAPS capsule calcium acetate(phosphat bind) 667 mg     ELIQUIS ANTICOAGULANT 2.5 MG tablet Take 1 tablet by mouth     Ferrous Sulfate (IRON SUPPLEMENT PO) Take 325 mg by mouth 2 times daily (with meals)      rosuvastatin (CRESTOR) 5 MG tablet Take 5 mg by mouth     No current facility-administered medications for this visit.       Vitals:   2/8/2024  7:45 AM   Vital Signs    Systolic 166 !    Diastolic 62 !    Pulse 90    Temperature    Respirations    Weight (LB) 262 lb 1.6 oz    Height 5' 7\"    BMI (Calculated) 41.05    Pain Score    O2 97 %       Legend:  ! Abnormal    Exam:  GENERAL APPEARANCE: alert and no distress  HENT: mouth without ulcers or lesions, good dentition  RESP: lungs clear to auscultation - no rales, rhonchi or wheezes  CV: regular rhythm, normal rate, no rub, no murmur  EDEMA: no LE edema bilaterally  ABDOMEN: soft, nondistended, nontender, central obesity  MS: extremities normal - no gross deformities noted, no evidence of inflammation in joints, no muscle tenderness  SKIN: no rash    Results:   No results found for this or any previous visit (from the past 336 hour(s)).          Again, thank you for allowing me to participate in the care of your patient.        Sincerely,        Briseida Santoyo PA-C  "

## 2024-02-08 NOTE — LETTER
"    2/8/2024         RE: Bao Keenan  [address] [facility] [address]        Dear Colleague,    Thank you for referring your patient, Bao Keenan, to the [facility] TRANSPLANT [facility]IN[facility]. Please see a copy of my visit note below.    Patient has end-stage renal disease and here to assess candidacy for renal transplantation.    Please see my nephrology colleague notes.    Briefly:  Patient has end-stage renal disease is currently on dialysis.  Over the last year he has lost about 70 pounds.  He has also lost some fluid due to dialysis.  He switched over from warfarin to Eliquis for his hypercoagulability and his diet has improved and is now taking more salads.  He is otherwise quite functional.  He is working 40 hours a week and dialyzing 3 times a week.  No recent chest pain heart attack or any other symptoms.    BP (!) 166/62   Pulse 90   Ht 1.702 m (5' 7\")   Wt 118.9 kg (262 lb 1.6 oz)   SpO2 97%   BMI 41.05 kg/m      Examination of the abdomen: He has got significant pannus present.  The right femoral pulses weakly felt.    Clinical impression: End-stage renal disease.    Recommendations:    #1.  He needs a hepatology consultation.  In the past he has had portal vein thrombosis with varices and looks like he met with interventional radiology but I cannot find the notes as to the current situation.  Would for start with a ultrasound Doppler to evaluate the patency of the portal vein and then a clearance from hepatology as to the etiology of the portal vein thrombosis.  #2.  He should continue to lose weight.  At this time weight is not a contraindication and he can move ahead with kidney transplant if everything else works out.  #3.  He needs CT scan of the abdomen without any IV contrast to assess for any calcifications.    I had a long discussion with the patient regarding kidney transplantation in general and the following points in particular:    Survival statistics at one, five and ten " years following kidney transplantation both for living-related and cadaveric allografts.  The kidney transplant selection committee process.  The complications following kidney transplant that included but were not limited to wound infection, vascular complications, ureter leak, ureteral strictures, and bowel obstruction  The need for lifelong immunosuppressive therapy and the side effects of these medications including specifically the risk of cancer and lymphoma.  The waiting list time of approximately a year or more for cadaveric transplants.  The statistical superiority of a living-related donor and the compelling reasons to encourage that therapy.    The patient understands these issues quite well and is eager to proceed with our recommendation and with transplantation.      Again, thank you for allowing me to participate in the care of your patient.        Sincerely,        Hussain Farmer MD

## 2024-02-08 NOTE — LETTER
2/8/2024         RE: Bao Keenan  [address] [facility] [address]        Dear Colleague,    Thank you for referring your patient, Bao Keenan, to the [facility] TRANSPLANT CLINIC. Please see a copy of my visit note below.    Psychosocial Assessment For Kidney  Transplantation  Patient Name/ Age: Bao Keenan 63 year old   Medical Record #: [id number]  Duration of Interview:     30 min  Process:   Face-to-Face Interview                (counseling < 50%)   Present at Appointment: Kidney         : KT Fong Date:  February 8, 2024        Type of transplant: Kidney     Donor type:      relative and friend   Prior Transplants:    No Status of Transplant:           Current Living Situation    Location:   [address] [place] [address]  With Whom: lives with their spouse       Family/ Social Support:    Pt resides in [place], [place] with spouse- Vanessa. They share three adult sons- Antonio-31, Bernard- 29 and Storm-27 (2 live [place]/1 in [place]). Pt has two siblings ([facility]). Pt feels that he has a vast, strong support network.    Spouse to be support person post transplant.    available, helpful   Committed Relationship:  Wife- Vanessa   Stable/Supportive   Other Supports:   Large Taoism community, big support system, etc...  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 Pt spouse works as a  and [employer] lessons.       Income   Salary/wages   Insurance      At this time, patient can afford medication costs:  Yes  Private Insurance- [facility] [place]   *Plans to obtain ESRD Medicare        Medical Status    Current Mode of Treatment for ESRD Dialysis   Complications None       Behavioral    Tobacco Use No Chemical Dependency No   No  current or previous tobacco use.  No chemical use or previous concerns.      Psychiatric Impairment No    No previous or current mental health concerns.     Reading Ability: Good  Education Level: Bachelors Degree Recent Legal History No      Coping Style/Strategies: chess, [place]ian, gardening        Ability to Adhere to Complex Medical Regime: No    Adherence History:        Education  _X_ Medicare  _X_ Rehabilitation  _X_ Donor issues  _X_ Community resources  _X_ Post discharge housing  _X_ Financial resources  _X_ Medical insurance options  _X_ Psych adjustment  _X_ Family adjustment  _X_ Health Care Directive Provided Education   Psychosocial Risks of Transplant Reviewed and Discussed:  _X_ Increased stress related to emotional,            family, social, employment or financial           situation  _X_ Effect on work and/or disability benefits  _X_ Effect on future health and life           insurance  _X_ Transplant outcome expectations may           not be met  _X_ Mental Health Risks: anxiety,           depression, PTSD, guilt, grief and           chronic fatigue     Notable Items:   None noted.       Final Evaluation/Assessment   Patient seemed to process information well. Appeared well informed, motivated and able to follow post transplant requirements. Behavior was appropriate during interview. Has adequate income and insurance coverage. Adequate social support. No major contraindications noted for transplant.  At this time patient appears to understand the risks and benefits of transplant.      Recommendation  Acceptable    Selection Criteria Met:  Plan for support Yes   Chemical Dependence Yes   Smoking Yes   Mental Health Yes   Adequate Finances Yes    Signature: KT Fong    Title: Clinical           Again, thank you for allowing me to participate in the care of your patient.        Sincerely,        KT Fong

## 2024-02-08 NOTE — PROGRESS NOTES
TRANSPLANT NEPHROLOGY RECIPIENT EVALUATION NOTE    Assessment and Plan:  # Kidney Transplant Evaluation: Patient is a fair candidate overall. Benefits of a living donor transplant were discussed.    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    # Protein C Deficiency with Recurrent DVT: multiple episodes, including portal vein thrombosis, as below. He has been anticoagulated since about 2009, history of IVC filter, since removed. On Eliquis currently at patient's request, but was previously on long-term coumadin and would be open to going back on it if needed.    # Portal HTN/Gastropathy (on most recent 2022 EGD)  # Portal Vein Thrombosis  # Esophageal Varices (s/p banding 2014)    2/2 protein C deficiency  - Partial splenic artery embolization, venous embolization of gastric varices and splenorenal shunts. Platelets ~120k. Needs hepatology.     # Microscopic Hematuria: previous UAs with ~6-10 RBCs, but now > 100. will refer to urology.     # Prediabetes: A1c 6%. Follow up with PCP.     # Cardiac Risk: history of cardiomyopathy of unclear etiology with EF 40-45% in 2014 (since recovered) and 1st degree AV block. Needs risk assessment.      # PAD Screening: per surgery    # Obesity: BMI 41. Has lost about 70 lbs in the last year. Surgeon input regarding appropriateness of body habitus for transplant.    # Normal Hemoglobin: will get renal US to assess for mass.     # Health Maintenance: Colonoscopy: Up to date and Dental: Up to date    Recommendations:  - Review Eliquis. May need to transition back to coumadin for transplant   - Referral to hepatology  - Cardiac risk assessment  - PAD imaging per surgery  - Review BMI  - Renal US    - Discussed the risks and benefits of a transplant, including the risk of surgery and immunosuppression medications.  Patient's overall evaluation will be discussed in the Transplant Program's regular meeting with a final  recommendation on the patients suitability for transplant to be made at that time.    Evaluation:  Bao Keenan was seen in consultation at the request of Dr. Hussain Farmer for evaluation as a potential kidney transplant recipient.    Reason for Visit:  Bao Keenan is a 63 year old male with ESKD from IgA nephropathy, who presents for kidney transplant evaluation.    History of Present Illness:         Kidney Disease Hx:        Native kidney biopsy 2016 with IgA nephropathy ([id number]), creatinine mid-to-high 1's mg/dl. Serologic evaluation at that time in 2016 was negative (WESTLEY, ANCA, hepatitis C antibody, hep B surface antigen, and SPEP). Severe FELICIANO 2020 from sepsis 2/2 cellulitis, required HD 10/8/2020-1/11/2021. FELICIANO May 2022, severe near life threatening bleed with EGD showing portal gastropathy. HD started Jan 2023 during admit for GI bleed. HD is going OK. Still has UO. LUE AVF.        Primary Nephrologist: Dr. Rodriguez       H/o Kidney Stones: No       H/o Recurrent/Frequent UTI: No         Diabetic Hx: Borderline            Cardiac/Vascular Disease Risk Factors:        Known CAD: No       Known PAD/Caludication Symptoms: No       Known Heart Failure: Decreased LVEF       Arrhythmia: No       Pulmonary Hypertension: No       Valvular Disease: No       Other: None         Viral Serology Status       CMV IgG Antibody: Positive       EBV IgG Antibody: Positive         Volume Status/Weight:        Volume status: Euvolemic       BMI: 41         Functional Capacity/Frailty:        Works full time for medical device company. Not too much exercise but no limitations. Can walk at least a block. No chest pain or SOB.    Fatigue/Decreased Energy: [x] No [] Yes    Chest Pain or SOB with Exertion: [x] No [] Yes    Significant Weight Change: [x] No [] Yes    Nausea, Vomiting or Diarrhea: [x] No [] Yes    Fever, Sweats or Chills:  [x] No [] Yes    Leg Swelling [x] No [] Yes        Allergy Testing  Questions:   Medication that caused a reaction None   Antibiotics used that didn't give an allergic reaction?  None    Potential Living Kidney Donors: Yes    Review of Systems:  A comprehensive review of systems was obtained and negative, except as noted in the HPI or PMH.    Past Medical History:   Medical record was reviewed and PMH was discussed with patient and noted below.  Past Medical History:   Diagnosis Date    Anemia     Antiplatelet or antithrombotic long-term use     Arthritis     Right Knee    Coagulation disorder (H24)     Protein C deficiency    Gastro-oesophageal reflux disease     History of blood transfusion     Hypertension     Liver disease     non alcoholic fatty liver    Portal hypertension (H)     Renal disease     CKD    Thrombosis of leg     +DVT       Past Social History:   Past Surgical History:   Procedure Laterality Date    BIOPSY      2014    COLONOSCOPY      ENT SURGERY      tonsils    EYE SURGERY      lasik    IR IVC FILTER PLACEMENT      IR IVC FILTER REMOVAL  12/17/2014    TIPS PROCEDURE       Personal history of bleeding or anesthesia problems: No    Family History:  No family history on file.    Personal History:   Social History     Socioeconomic History    Marital status:      Spouse name: Not on file    Number of children: Not on file    Years of education: Not on file    Highest education level: Not on file   Occupational History    Not on file   Tobacco Use    Smoking status: Never    Smokeless tobacco: Never   Substance and Sexual Activity    Alcohol use: No    Drug use: No    Sexual activity: Not on file   Other Topics Concern    Parent/sibling w/ CABG, MI or angioplasty before 65F 55M? Not Asked   Social History Narrative    Not on file     Social Determinants of Health     Financial Resource Strain: Not on file   Food Insecurity: Not on file   Transportation Needs: Not on file   Physical Activity: Not on file   Stress: Not on file   Social Connections: Not on file  "  Interpersonal Safety: Not on file   Housing Stability: Not on file       Allergies:  No Known Allergies    Medications:  Current Outpatient Medications   Medication Sig    B Complex-C-Zn-Folic Acid (DIALYVITE/ZINC) TABS Take 1 tablet by mouth every morning    calcium acetate (PHOSLO) 667 MG CAPS capsule calcium acetate(phosphat bind) 667 mg    ELIQUIS ANTICOAGULANT 2.5 MG tablet Take 1 tablet by mouth    Ferrous Sulfate (IRON SUPPLEMENT PO) Take 325 mg by mouth 2 times daily (with meals)     rosuvastatin (CRESTOR) 5 MG tablet Take 5 mg by mouth     No current facility-administered medications for this visit.       Vitals:   2/8/2024  7:45 AM   Vital Signs    Systolic 166 !    Diastolic 62 !    Pulse 90    Temperature    Respirations    Weight (LB) 262 lb 1.6 oz    Height 5' 7\"    BMI (Calculated) 41.05    Pain Score    O2 97 %       Legend:  ! Abnormal    Exam:  GENERAL APPEARANCE: alert and no distress  HENT: mouth without ulcers or lesions, good dentition  RESP: lungs clear to auscultation - no rales, rhonchi or wheezes  CV: regular rhythm, normal rate, no rub, no murmur  EDEMA: no LE edema bilaterally  ABDOMEN: soft, nondistended, nontender, central obesity  MS: extremities normal - no gross deformities noted, no evidence of inflammation in joints, no muscle tenderness  SKIN: no rash    Results:   No results found for this or any previous visit (from the past 336 hour(s)).        "

## 2024-02-08 NOTE — PROGRESS NOTES
Kidney Transplant Referral - 6/13/2023  Patient attended appointments accompanied by wife  Patient completed AM appointments with all [facility] providers.  Time and location of PM appointments reviewed with patient.  Patient instructed next contact from Transplant Coordinator will be following Selection Committee  Patient stated understanding  Patient unsure if educational materials received.  Will confirm with Transplant Coordinator next week.Receipt of Information for Organ Transplant Recipient form deferred.  KDPI form signed and faxed to HIM  Patient states he and wife watched [facility] Pre Kidney Transplant videos.       Summary    Team s concerns/comments:   1) Cardiac risk assessment  2) PAD assessment  3) BMI  4) DVT  5) Esohphageal varices, portal vein thrombosis, portal HTN  6) Prediabetes  7) Microscopic Hematuria  8) Normal Hgb  9) Health maintenance    Candidacy category: Yellow    Action/Plan:  1) EKG, Echocardiogram today. Cardiology consult and clearance  2) A/P CT without contrast, Iliac US  3) Continue to lose weight. Surgery input on weight goal  4) Refer to Bleeding/Clotting  5) Refer to Hepatology. US doppler  6) F/U PCP  7) Refer to Urology  8) Renal US  9) Colonoscopy and Dental UTD    Expected Selection Meeting Discussion: 2/14/24

## 2024-02-08 NOTE — PROGRESS NOTES
Psychosocial Assessment For Kidney  Transplantation  Patient Name/ Age: Bao Keenan 63 year old   Medical Record #: [id number]  Duration of Interview:     30 min  Process:   Face-to-Face Interview                (counseling < 50%)   Present at Appointment: Kidney         : KT Fong Date:  February 8, 2024        Type of transplant: Kidney     Donor type:      relative and friend   Prior Transplants:    No Status of Transplant:           Current Living Situation    Location:   [address] [place] [address]  With Whom: lives with their spouse       Family/ Social Support:    Pt resides in [place], [place] with spouse- Vanessa. They share three adult sons- Antonio-31, Bernard- 29 and Storm-27 (2 live [place]/1 in [place]). Pt has two siblings ([facility]). Pt feels that he has a vast, strong support network.    Spouse to be support person post transplant.    available, helpful   Committed Relationship:  Wife- Vanessa   Stable/Supportive   Other Supports:   Large Rastafari community, big support system, etc...  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 Pt spouse works as a  and [employer] lessons.       Income   Salary/wages   Insurance      At this time, patient can afford medication costs:  Yes  Private Insurance- [facility]   *Plans to obtain ESRD Medicare        Medical Status    Current Mode of Treatment for ESRD Dialysis   Complications None       Behavioral    Tobacco Use No Chemical Dependency No   No current or previous tobacco use.  No chemical use or previous concerns.      Psychiatric Impairment No    No previous or current mental health concerns.     Reading Ability: Good  Education Level: Bachelors Degree Recent Legal History No      Coping  Style/Strategies: chess, [place], gardening        Ability to Adhere to Complex Medical Regime: No    Adherence History:        Education  _X_ Medicare  _X_ Rehabilitation  _X_ Donor issues  _X_ Community resources  _X_ Post discharge housing  _X_ Financial resources  _X_ Medical insurance options  _X_ Psych adjustment  _X_ Family adjustment  _X_ Health Care Directive Provided Education   Psychosocial Risks of Transplant Reviewed and Discussed:  _X_ Increased stress related to emotional,            family, social, employment or financial           situation  _X_ Effect on work and/or disability benefits  _X_ Effect on future health and life           insurance  _X_ Transplant outcome expectations may           not be met  _X_ Mental Health Risks: anxiety,           depression, PTSD, guilt, grief and           chronic fatigue     Notable Items:   None noted.       Final Evaluation/Assessment   Patient seemed to process information well. Appeared well informed, motivated and able to follow post transplant requirements. Behavior was appropriate during interview. Has adequate income and insurance coverage. Adequate social support. No major contraindications noted for transplant.  At this time patient appears to understand the risks and benefits of transplant.      Recommendation  Acceptable    Selection Criteria Met:  Plan for support Yes   Chemical Dependence Yes   Smoking Yes   Mental Health Yes   Adequate Finances Yes    Signature: KT Fong    Title: Clinical

## 2024-02-09 LAB
CARDIOLIPIN IGG SER IA-ACNC: 2 GPL-U/ML
CARDIOLIPIN IGG SER IA-ACNC: NEGATIVE
CARDIOLIPIN IGM SER IA-ACNC: 2.1 MPL-U/ML
CARDIOLIPIN IGM SER IA-ACNC: NEGATIVE
HBV CORE AB SERPL QL IA: NONREACTIVE
HBV SURFACE AB SERPL IA-ACNC: >1000 M[IU]/ML
HBV SURFACE AB SERPL IA-ACNC: REACTIVE M[IU]/ML
HBV SURFACE AG SERPL QL IA: NONREACTIVE
HCV AB SERPL QL IA: NONREACTIVE
HIV 1+2 AB+HIV1 P24 AG SERPL QL IA: NONREACTIVE
QUANTIFERON MITOGEN: 10 IU/ML
QUANTIFERON NIL TUBE: 0 IU/ML
QUANTIFERON TB1 TUBE: 0.01 IU/ML
QUANTIFERON TB2 TUBE: 0.01
THROMBIN TIME: 19.1 SECONDS (ref 13–19)

## 2024-02-10 LAB
BACTERIA UR CULT: NORMAL
GAMMA INTERFERON BACKGROUND BLD IA-ACNC: 0 IU/ML
M TB IFN-G BLD-IMP: NEGATIVE
M TB IFN-G CD4+ BCKGRND COR BLD-ACNC: 10 IU/ML
MITOGEN IGNF BCKGRD COR BLD-ACNC: 0.01 IU/ML
MITOGEN IGNF BCKGRD COR BLD-ACNC: 0.01 IU/ML

## 2024-02-13 LAB
A*: NORMAL
A*LOCUS SEROLOGIC EQUIVALENT: 3
A*LOCUS: NORMAL
A*SEROLOGIC EQUIVALENT: 23
ABTEST METHOD: NORMAL
B*: NORMAL
B*LOCUS SEROLOGIC EQUIVALENT: 7
B*LOCUS: NORMAL
B*SEROLOGIC EQUIVALENT: 49
BW-1: NORMAL
BW-2: NORMAL
C*: NORMAL
C*LOCUS SEROLOGIC EQUIVALENT: 7
C*LOCUS: NORMAL
C*SEROLOGIC EQUIVALENT: 7
DPA1*: NORMAL
DPB1*: NORMAL
DQA1*: NORMAL
DQA1*LOCUS: NORMAL
DQB1*: NORMAL
DQB1*LOCUS SEROLOGIC EQUIVALENT: 7
DQB1*LOCUS: NORMAL
DQB1*SEROLOGIC EQUIVALENT: 6
DRB1*: NORMAL
DRB1*LOCUS SEROLOGIC EQUIVALENT: 11
DRB1*LOCUS: NORMAL
DRB1*SEROLOGIC EQUIVALENT: 15
DRB3*LOCUS SEROLOGIC EQUIVALENT: 52
DRB3*LOCUS: NORMAL
DRB5*: NORMAL
DRB5*SEROLOGIC EQUIVALENT: 51
DRSSO TEST METHOD: NORMAL

## 2024-02-14 ENCOUNTER — COMMITTEE REVIEW (OUTPATIENT)
Dept: TRANSPLANT | Facility: CLINIC | Age: 64
End: 2024-02-14
Payer: COMMERCIAL

## 2024-02-14 NOTE — COMMITTEE REVIEW
Abdominal Committee Review Note     Evaluation Date: 2/8/2024  Committee Review Date: 2/14/2024    Organ being evaluated for: Kidney    Transplant Phase: Evaluation  Transplant Status: Active    Transplant Coordinator: Gracie Carbajal  Transplant Surgeon:  Dr. Hussain Farmer     Referring Physician: Dr. Anderson Davey    Primary Diagnosis: IgA Nephropathy  Secondary Diagnosis:     Committee Review Members:  Nephrology Marcelo Aquino MD   Nutrition Jordyn Saldivar, PATTI   Pharmacist Regina Nix, [facility]    - Clinical Elizabeth Gretchen Crowe, [facility], Deepti Singer [facility]   Transplant GAGAN FLORES, RN, Lenora Mike, RN, Diana Graves, RN, Gracie Carbajal, RN, Jeanne Basilio, NP, Lui Chaudhary, TAMI, Nae Cunningham, TAMI, Charlette Ly, TAMI, Rashmi Blanco MD   Transplant Surgery Rashmi Blanco MD       Transplant Eligibility:     Committee Review Decision: Approved    Relative Contraindications:     Absolute Contraindications:     Committee Chair Rashmi Blanco MD verbally attested to the committee's decision.    Committee Discussion Details: Reviewed pt's medical status and evaluation results to date with multidisciplinary committee.    Recommended the following evaluation items:    Cardiology: Will need risk assessment   Urology: will need clearance w/ Cystoscopy d/t microscopic hematuria   Hepatology: will need clearance  BMI: ok'd at current weight of 262 lbs/BMI of 41, ok'd at current weight, cannot gain   Imaging: will need CT Ab/Pelv, iliac US, and renal US d/t normal Hgb  Eliquis: the pt will need to transition back to Coumadin prior to active listing status, will not need to see Bleeding/Clotting clinic as he is already anticoagulated    Patient should have live donors register now to initiate donor evaluation: Yes    Committee determined that patient is a Good candidate for Kidney     Listing plan: Will not list at this time as patient is on dialysis    A2B Candidate: No    Patient will  be called and summary letter will be sent.

## 2024-02-15 ENCOUNTER — DOCUMENTATION ONLY (OUTPATIENT)
Dept: TRANSPLANT | Facility: CLINIC | Age: 64
End: 2024-02-15
Payer: COMMERCIAL

## 2024-02-15 ENCOUNTER — TELEPHONE (OUTPATIENT)
Dept: TRANSPLANT | Facility: CLINIC | Age: 64
End: 2024-02-15
Payer: COMMERCIAL

## 2024-02-15 DIAGNOSIS — I10 ESSENTIAL HYPERTENSION: ICD-10-CM

## 2024-02-15 DIAGNOSIS — N18.6 END STAGE RENAL DISEASE (H): ICD-10-CM

## 2024-02-15 DIAGNOSIS — I25.10 CARDIOVASCULAR DISEASE: ICD-10-CM

## 2024-02-15 DIAGNOSIS — Z01.818 PRE-TRANSPLANT EVALUATION FOR KIDNEY TRANSPLANT: Primary | ICD-10-CM

## 2024-02-15 DIAGNOSIS — N02.B9 IGA NEPHROPATHY: ICD-10-CM

## 2024-02-15 DIAGNOSIS — E78.5 HYPERLIPIDEMIA: ICD-10-CM

## 2024-02-15 DIAGNOSIS — Z76.82 ORGAN TRANSPLANT CANDIDATE: ICD-10-CM

## 2024-02-15 NOTE — PROGRESS NOTES
Kidney Transplant Evaluation - 2/8/2024  Bao Keenan attended the pre-transplant patient education class today. The [facility] website pre-transplant modules were viewed; class participants were educated on using the site.     Content reviewed:  Living Donation and how to access that program  Paired exchange  Kidney Donor Profile Index (KDPI)  Waiting list issues (right to decline without penalty, high PHS risk donors, what to expect when called with an offer)  Hospital experience, length of stay, need to stay locally post-discharge (2-4 weeks)                       Post-surgery lifting and driving restrictions  Post-transplant routines, frequency of lab work and clinic visits  Need to stay locally post-discharge (2-4 weeks)  Role of Transplant Coordinator    Participants were informed of the benefits of transplant as well as potential risks such as infection, cancer, and death.  The need for total adherence with immunosuppression medications and following transplant regimens was stressed.  The overall evaluation/approval/listing process was reviewed.

## 2024-02-15 NOTE — LETTER
"02/16/24        Bao Keenan  [address] [facility] [address]        Dear Bao,    It was a pleasure to see you recently for consideration of kidney transplantation. Your pre-transplant evaluation results were reviewed at our Multidisciplinary Selection Committee on 2/14/24. The Committee has approved you to move forward with your transplant evaluation and is requesting the following items are completed before your case will be reviewed next:    Please continue to work on weight loss, you have been approved at your current   weight, but you may not remain a candidate if you gain weight    Hepatology clearance - our schedulers will call you to arrange this visit    Cardiology clearance - our schedulers will call you to arrange this visit    Urology clearance - our schedulers will call you to arrange this visit    Abdominal/Pelvic CT scan - our schedulers will call you to arrange this test    Iliac ultrasound - our schedulers will call you to arrange this test    Renal ultrasound - our schedulers will call you to arrange this test    Please electronically sign the \"Receipt of Information\" that has been sent to your email    We will want to transition you back to Warfarin prior to listing    For any questions, please contact the Transplant Office at [phone] or you may reach me directly at [phone].      Sincerely,  Gracie Carbajal RN, Pre-Kidney/Pancreas Transplant Coordinator   Solid Organ Transplant  [facility], [facility]    CC: Care Team  "

## 2024-02-16 LAB
PROTOCOL CUTOFF: NORMAL
SA 1 CELL: NORMAL
SA 1 TEST METHOD: NORMAL
SA 2 CELL: NORMAL
SA 2 TEST METHOD: NORMAL
SA1 HI RISK ABY: NORMAL
SA1 MOD RISK ABY: NORMAL
SA2 HI RISK ABY: NORMAL
SA2 MOD RISK ABY: NORMAL
UNACCEPTABLE ANTIGENS: NORMAL
UNOS CPRA: 54
ZZZSA 1  COMMENTS: NORMAL
ZZZSA 2 COMMENTS: NORMAL

## 2024-02-16 NOTE — TELEPHONE ENCOUNTER
Called pt to discuss outcome of Selection Committee. We discussed that he is approved to move forward with his evaluation and reviewed his next steps:    Wt. Loss recommended (Ok'd at current BMI 41/262lbs)  Hepatology  Cards  CT Ab/Pelv  Iliac US  Urology - microscopic hematuria   Transition back to Coumadin - doesn't need to see Bleeding/Clotting - will do prior to listing  Renal US d/t normal hgb  LOUIE - needs to sign, resent email today    We discussed that is encouraged he continue to work on weight loss and if he were to gain weight, he may not qualify for transplant - he expressed understanding. He has completed the online pt education (see separate encounter).  We discussed that transitioning back to Coumadin will be a final step in the process. I encouraged him to have living donors register, I will send him the donor link. I let him know the schedulers will reach out to arrange his follow up visits. He had no further questions at this time. Follow up orders placed.

## 2024-02-20 ENCOUNTER — OFFICE VISIT (OUTPATIENT)
Dept: CARDIOLOGY | Facility: CLINIC | Age: 64
End: 2024-02-20
Attending: PHYSICIAN ASSISTANT
Payer: COMMERCIAL

## 2024-02-20 VITALS
OXYGEN SATURATION: 97 % | SYSTOLIC BLOOD PRESSURE: 108 MMHG | HEART RATE: 81 BPM | DIASTOLIC BLOOD PRESSURE: 60 MMHG | WEIGHT: 263 LBS | HEIGHT: 67 IN | BODY MASS INDEX: 41.28 KG/M2

## 2024-02-20 DIAGNOSIS — Z76.82 ORGAN TRANSPLANT CANDIDATE: ICD-10-CM

## 2024-02-20 DIAGNOSIS — E78.5 HYPERLIPIDEMIA, UNSPECIFIED HYPERLIPIDEMIA TYPE: ICD-10-CM

## 2024-02-20 DIAGNOSIS — I10 ESSENTIAL HYPERTENSION: ICD-10-CM

## 2024-02-20 DIAGNOSIS — N18.6 END STAGE RENAL DISEASE (H): ICD-10-CM

## 2024-02-20 DIAGNOSIS — N02.B9 IGA NEPHROPATHY: ICD-10-CM

## 2024-02-20 DIAGNOSIS — Z01.818 PRE-TRANSPLANT EVALUATION FOR KIDNEY TRANSPLANT: ICD-10-CM

## 2024-02-20 DIAGNOSIS — I25.10 CARDIOVASCULAR DISEASE: ICD-10-CM

## 2024-02-20 PROCEDURE — 99204 OFFICE O/P NEW MOD 45 MIN: CPT | Performed by: INTERNAL MEDICINE

## 2024-02-20 PROCEDURE — 99213 OFFICE O/P EST LOW 20 MIN: CPT | Performed by: INTERNAL MEDICINE

## 2024-02-20 RX ORDER — ROSUVASTATIN CALCIUM 5 MG/1
5 TABLET, COATED ORAL EVERY MORNING
COMMUNITY
Start: 2024-02-07

## 2024-02-20 ASSESSMENT — PAIN SCALES - GENERAL: PAINLEVEL: NO PAIN (0)

## 2024-02-20 NOTE — PATIENT INSTRUCTIONS
You were seen today in the Cardiovascular Clinic at the [facility].   Cardiology Providers you saw during your visit:    Dr. Vergara    Diagnosis:   Transplant clearance    Recommendations:   - Exercise stress echo in the next 3-4 weeks    Follow-up:   - Dr. Vergara will call you with the results    For emergencies call 911.     If you have any questions regarding your visit please contact your care team:     Walker Schulz RN  [facility]  Cardiology Care Coordinator    Appointment scheduling or nurse questions: [phone]    On Call Cardiologist for after hours or on weekends: [phone]    option #4    If you need a medication refill please contact your pharmacy.  Please allow 3 business days for your refill to be completed.    As always, thank you for trusting us with your health care needs!

## 2024-02-20 NOTE — NURSING NOTE
Chief Complaint   Patient presents with    New Patient     New Cardiology referral for pre-kidney transplant eval       Vitals were taken, medications reviewed.    Triny [place], EMT   9:12 AM

## 2024-02-20 NOTE — LETTER
2/20/2024      RE: Bao Keenan  [id number] [address] [facility] [id number]       Dear Colleague,    Thank you for the opportunity to participate in the care of your patient, Bao Keenan, at the [facility] [place] [facility]. Please see a copy of my visit note below.    February 20, 2024    Dear Colleagues,    I had the pleasure of seeing your patient Bao Keenan at the [facility] Pulmonary Hypertension clinic.  As you know, Marito is a 63 year old male with history of MASH, ESRD with unclear etiology, DVT and Protein C Deficiency, and hypertension who presents for evaluation for cardiac risk stratification for kidney transplantation. Marito has been dialyzing for over a year three times a week and he tolerates it pretty well. He does get some fatigue but only minimal. He is pretty active and he actually has no limitations. He is able to go up a flight of stairs without any difficulty. He can walk for >30 minutes. He is easily able to generate >4 METs without any problems. I would classify him as NYHA FC I.    He had a screening echocardiogram that showed normal biventricular function. There was no clear TR jet.       PAST MEDICAL HISTORY:  Past Medical History:   Diagnosis Date    Anemia     Antiplatelet or antithrombotic long-term use     Arthritis     Right Knee    Coagulation disorder (H24)     Protein C deficiency    Gastro-oesophageal reflux disease     History of blood transfusion     Hypertension     Liver disease     non alcoholic fatty liver    Portal hypertension (H)     Renal disease     CKD    Thrombosis of leg     +DVT       FAMILY HISTORY:  No family history on file.    SOCIAL HISTORY:  Social History     Socioeconomic History    Marital status:      Spouse name: None    Number of children: None    Years of education: None    Highest education level: None   Tobacco Use    Smoking status: Never    Smokeless  "tobacco: Never   Substance and Sexual Activity    Alcohol use: No    Drug use: No       CURRENT MEDICATIONS:  Current Outpatient Medications   Medication Sig Dispense Refill    B Complex-C-Zn-Folic Acid (DIALYVITE/ZINC) TABS Take 1 tablet by mouth every morning      calcium acetate (PHOSLO) 667 MG CAPS capsule calcium acetate(phosphat bind) 667 mg      ELIQUIS ANTICOAGULANT 2.5 MG tablet Take 1 tablet by mouth      Ferrous Sulfate (IRON SUPPLEMENT PO) Take 325 mg by mouth 2 times daily (with meals)       rosuvastatin (CRESTOR) 5 MG tablet Take 5 mg by mouth      acetaminophen (TYLENOL) 325 MG tablet Take 325-650 mg by mouth every 6 hours as needed for mild pain      Ascorbic Acid (VITAMIN C PO) Take 500 mg by mouth 2 times daily       enoxaparin (ENOXAPARIN) 120 MG/0.8ML SOLN Inject 0.8 mLs (120 mg) Subcutaneous 2 times daily (Patient not taking: Reported on 2/20/2024) 60 Syringe 0    lisinopril (PRINIVIL,ZESTRIL) 5 MG tablet Take 1 tablet (5 mg) by mouth daily 90 tablet 3    Multiple Vitamins-Minerals (MULTIVITAMIN OR) Take 1 tablet by mouth daily Childrens Complete Chewable      NADOLOL PO Take 40 mg by mouth At Bedtime       pantoprazole (PROTONIX) 40 MG enteric coated tablet Take by mouth 2 times daily      vitamin C (ASCORBIC ACID) 500 MG tablet Take 500 mg by mouth daily (Patient not taking: Reported on 2/20/2024)         ROS:   10 point ROS negative except HPI    EXAM:  /60 (BP Location: Right arm, Patient Position: Sitting, Cuff Size: Adult Large)   Pulse 81   Ht 1.695 m (5' 6.73\")   Wt 119.3 kg (263 lb)   SpO2 97%   BMI 41.52 kg/m    General: appears comfortable, alert and articulate  Head: normocephalic, atraumatic  Eyes: anicteric sclera, EOMI  Neck: no adenopathy  Orophyarynx: moist mucosa, no lesions, dentition intact  Heart: regular, S1/S2, no murmur, gallop, rub, estimated JVP 7 cm  Lungs: clear, no rales or wheezing  Abdomen: soft, non-tender,   Extremities: no clubbing, cyanosis or " edema  Neurological: normal speech and affect, no gross motor deficits    Labs:  CBC RESULTS:  Lab Results   Component Value Date    WBC 5.1 02/08/2024    WBC 6.0 10/07/2014    RBC 4.43 02/08/2024    RBC 4.14 (L) 10/07/2014    HGB 13.8 02/08/2024    HGB 11.8 (L) 10/07/2014    HCT 39.8 (L) 02/08/2024    HCT 36.2 (L) 10/07/2014    MCV 90 02/08/2024    MCV 87 10/07/2014    MCH 31.2 02/08/2024    MCH 28.5 10/07/2014    MCHC 34.7 02/08/2024    MCHC 32.6 10/07/2014    RDW 13.6 02/08/2024    RDW 16.7 (H) 10/07/2014     (L) 02/08/2024     10/07/2014       CMP RESULTS:  Lab Results   Component Value Date     02/08/2024     09/11/2014    POTASSIUM 3.6 02/08/2024    POTASSIUM 4.2 09/11/2014    CHLORIDE 96 (L) 02/08/2024    CHLORIDE 108 09/11/2014    CO2 26 02/08/2024    CO2 23 09/11/2014    ANIONGAP 16 (H) 02/08/2024    ANIONGAP 9 09/11/2014     (H) 02/08/2024     (H) 09/11/2014    BUN 38.7 (H) 02/08/2024    BUN 18 09/11/2014    CR 4.36 (H) 02/08/2024    CR 1.34 (H) 09/11/2014    GFRESTIMATED 15 (L) 02/08/2024    GFRESTIMATED 56 (L) 09/11/2014    GFRESTBLACK 68 09/11/2014    RAQUEL 10.0 02/08/2024    RAQUEL 9.1 09/11/2014    BILITOTAL 0.9 02/08/2024    BILITOTAL 0.5 09/11/2014    ALBUMIN 4.5 02/08/2024    ALBUMIN 3.9 09/11/2014    ALKPHOS 71 02/08/2024    ALKPHOS 43 09/11/2014    ALT 16 02/08/2024    ALT 35 09/11/2014    AST 28 02/08/2024    AST 47 (H) 09/11/2014        Echocardiogram 2/24:  Global and regional left ventricular function is normal with an EF of 55-60%.  The right ventricle is normal in size and function.  No significant valvular abnormalities present.  No pericardial effusion is present.  IVC diameter <2.1 cm collapsing >50% with sniff suggests a normal RA pressure of 3 mmHg.  This study was compared with the study from 9/22/2014 with no significant changes noted.    Assessment and Plan: Bao Keenan is a 63 year old male with history of ESRD who presents for cardiac  evaluation.    Cardiac Evaluation for kidney transplant: He is able to generate >4 METs without problems. We will get an exercise stress echocardiogram to evaluate for CAD. We will also hope to get a TR jet to estimate PA pressures. If these are normal, no further cardiac evaluation is needed. He will be at the lowest risk possible going into a kidney transplant without any true interventions that will reduce that risk.     It was a pleasure seeing your patient Bao Keenan at the [facility] Pulmonary Hypertension clinic.  Please contact us with any questions or concerns that you may have.    Sincerely,    Paul Vergara MD, PhD, FAHA  Associate Professor of Medicine  Director, Chronic Thromboembolic Pulmonary Hypertension Program  Cardiovascular Division      I spent a total of 45 minutes on the date of service evaluating this patient which included face to face discussion, performing a physical exam, reviewing of the chart to gain information from other providers to obtain further history, personally reviewing echocardiograms showing normal biventricular function , ordering tests and/or medications, and documenting clinical information in the electronic health record.          Please do not hesitate to contact me if you have any questions/concerns.     Sincerely,     Paul Vergara MD   No abnormalities

## 2024-02-20 NOTE — PROGRESS NOTES
February 20, 2024    Dear Colleagues,    I had the pleasure of seeing your patient Bao Keenan at the [facility] Pulmonary Hypertension clinic.  As you know, Marito is a 63 year old male with history of MASH, ESRD with unclear etiology, DVT and Protein C Deficiency, and hypertension who presents for evaluation for cardiac risk stratification for kidney transplantation. Marito has been dialyzing for over a year three times a week and he tolerates it pretty well. He does get some fatigue but only minimal. He is pretty active and he actually has no limitations. He is able to go up a flight of stairs without any difficulty. He can walk for >30 minutes. He is easily able to generate >4 METs without any problems. I would classify him as NYHA FC I.    He had a screening echocardiogram that showed normal biventricular function. There was no clear TR jet.       PAST MEDICAL HISTORY:  Past Medical History:   Diagnosis Date    Anemia     Antiplatelet or antithrombotic long-term use     Arthritis     Right Knee    Coagulation disorder (H24)     Protein C deficiency    Gastro-oesophageal reflux disease     History of blood transfusion     Hypertension     Liver disease     non alcoholic fatty liver    Portal hypertension (H)     Renal disease     CKD    Thrombosis of leg     +DVT       FAMILY HISTORY:  No family history on file.    SOCIAL HISTORY:  Social History     Socioeconomic History    Marital status:      Spouse name: None    Number of children: None    Years of education: None    Highest education level: None   Tobacco Use    Smoking status: Never    Smokeless tobacco: Never   Substance and Sexual Activity    Alcohol use: No    Drug use: No       CURRENT MEDICATIONS:  Current Outpatient Medications   Medication Sig Dispense Refill    B Complex-C-Zn-Folic Acid (DIALYVITE/ZINC) TABS Take 1 tablet by mouth every morning      calcium acetate (PHOSLO) 667 MG CAPS capsule calcium acetate(phosphat bind) 667 mg  "     ELIQUIS ANTICOAGULANT 2.5 MG tablet Take 1 tablet by mouth      Ferrous Sulfate (IRON SUPPLEMENT PO) Take 325 mg by mouth 2 times daily (with meals)       rosuvastatin (CRESTOR) 5 MG tablet Take 5 mg by mouth      acetaminophen (TYLENOL) 325 MG tablet Take 325-650 mg by mouth every 6 hours as needed for mild pain      Ascorbic Acid (VITAMIN C PO) Take 500 mg by mouth 2 times daily       enoxaparin (ENOXAPARIN) 120 MG/0.8ML SOLN Inject 0.8 mLs (120 mg) Subcutaneous 2 times daily (Patient not taking: Reported on 2/20/2024) 60 Syringe 0    lisinopril (PRINIVIL,ZESTRIL) 5 MG tablet Take 1 tablet (5 mg) by mouth daily 90 tablet 3    Multiple Vitamins-Minerals (MULTIVITAMIN OR) Take 1 tablet by mouth daily Childrens Complete Chewable      NADOLOL PO Take 40 mg by mouth At Bedtime       pantoprazole (PROTONIX) 40 MG enteric coated tablet Take by mouth 2 times daily      vitamin C (ASCORBIC ACID) 500 MG tablet Take 500 mg by mouth daily (Patient not taking: Reported on 2/20/2024)         ROS:   10 point ROS negative except HPI    EXAM:  /60 (BP Location: Right arm, Patient Position: Sitting, Cuff Size: Adult Large)   Pulse 81   Ht 1.695 m (5' 6.73\")   Wt 119.3 kg (263 lb)   SpO2 97%   BMI 41.52 kg/m    General: appears comfortable, alert and articulate  Head: normocephalic, atraumatic  Eyes: anicteric sclera, EOMI  Neck: no adenopathy  Orophyarynx: moist mucosa, no lesions, dentition intact  Heart: regular, S1/S2, no murmur, gallop, rub, estimated JVP 7 cm  Lungs: clear, no rales or wheezing  Abdomen: soft, non-tender,   Extremities: no clubbing, cyanosis or edema  Neurological: normal speech and affect, no gross motor deficits    Labs:  CBC RESULTS:  Lab Results   Component Value Date    WBC 5.1 02/08/2024    WBC 6.0 10/07/2014    RBC 4.43 02/08/2024    RBC 4.14 (L) 10/07/2014    HGB 13.8 02/08/2024    HGB 11.8 (L) 10/07/2014    HCT 39.8 (L) 02/08/2024    HCT 36.2 (L) 10/07/2014    MCV 90 02/08/2024    MCV " 87 10/07/2014    MCH 31.2 02/08/2024    MCH 28.5 10/07/2014    MCHC 34.7 02/08/2024    MCHC 32.6 10/07/2014    RDW 13.6 02/08/2024    RDW 16.7 (H) 10/07/2014     (L) 02/08/2024     10/07/2014       CMP RESULTS:  Lab Results   Component Value Date     02/08/2024     09/11/2014    POTASSIUM 3.6 02/08/2024    POTASSIUM 4.2 09/11/2014    CHLORIDE 96 (L) 02/08/2024    CHLORIDE 108 09/11/2014    CO2 26 02/08/2024    CO2 23 09/11/2014    ANIONGAP 16 (H) 02/08/2024    ANIONGAP 9 09/11/2014     (H) 02/08/2024     (H) 09/11/2014    BUN 38.7 (H) 02/08/2024    BUN 18 09/11/2014    CR 4.36 (H) 02/08/2024    CR 1.34 (H) 09/11/2014    GFRESTIMATED 15 (L) 02/08/2024    GFRESTIMATED 56 (L) 09/11/2014    GFRESTBLACK 68 09/11/2014    RAQUEL 10.0 02/08/2024    RAQUEL 9.1 09/11/2014    BILITOTAL 0.9 02/08/2024    BILITOTAL 0.5 09/11/2014    ALBUMIN 4.5 02/08/2024    ALBUMIN 3.9 09/11/2014    ALKPHOS 71 02/08/2024    ALKPHOS 43 09/11/2014    ALT 16 02/08/2024    ALT 35 09/11/2014    AST 28 02/08/2024    AST 47 (H) 09/11/2014        Echocardiogram 2/24:  Global and regional left ventricular function is normal with an EF of 55-60%.  The right ventricle is normal in size and function.  No significant valvular abnormalities present.  No pericardial effusion is present.  IVC diameter <2.1 cm collapsing >50% with sniff suggests a normal RA pressure of 3 mmHg.  This study was compared with the study from 9/22/2014 with no significant changes noted.    Assessment and Plan: Bao Keenan is a 63 year old male with history of ESRD who presents for cardiac evaluation.    Cardiac Evaluation for kidney transplant: He is able to generate >4 METs without problems. We will get an exercise stress echocardiogram to evaluate for CAD. We will also hope to get a TR jet to estimate PA pressures. If these are normal, no further cardiac evaluation is needed. He will be at the lowest risk possible going into a kidney  transplant without any true interventions that will reduce that risk.     It was a pleasure seeing your patient Bao Keenan at the [facility] Pulmonary Hypertension clinic.  Please contact us with any questions or concerns that you may have.    Sincerely,    Paul Vergara MD, PhD, FAHA  Associate Professor of Medicine  Director, Chronic Thromboembolic Pulmonary Hypertension Program  Cardiovascular Division      I spent a total of 45 minutes on the date of service evaluating this patient which included face to face discussion, performing a physical exam, reviewing of the chart to gain information from other providers to obtain further history, personally reviewing echocardiograms showing normal biventricular function , ordering tests and/or medications, and documenting clinical information in the electronic health record.

## 2024-02-22 PROBLEM — N18.6 END STAGE RENAL DISEASE (H): Status: ACTIVE | Noted: 2024-02-22

## 2024-02-22 PROBLEM — N02.B9 IGA NEPHROPATHY: Status: ACTIVE | Noted: 2024-02-22

## 2024-02-27 NOTE — CONFIDENTIAL NOTE
DIAGNOSIS:    Pre-transplant evaluation for kidney transplant   Cardiovascular disease   End stage renal disease (H)   IgA nephropathy   Organ transplant candidate   Essential hypertension   Hyperlipidemia      Appt Date:  04.11.2024    NOTES STATUS DETAILS   OFFICE NOTE from referring provider Internal 02.15.2024 Briseida Santoyo PA-C    OFFICE NOTES from other specialists Internal 02.20.2024 Paul Vergara MD    DISCHARGE SUMMARY from hospital     MEDICATION LIST Internal    LIVER BIOSPY (IF APPLICABLE)      PATHOLOGY REPORTS      IMAGING     ENDOSCOPY (IF AVAILABLE) Received 01.07.2023    COLONOSCOPY (IF AVAILABLE) Received 01.11.2023    ULTRASOUND LIVER     CT OF ABDOMEN     MRI OF LIVER     FIBROSCAN, US ELASTOGRAPHY, FIBROSIS SCAN, MR ELASTOGRAPHY     LABS     HEPATIC PANEL (LIVER PANEL) Care Everywhere 01.04.2023    BASIC METABOLIC PANEL Care Everywhere 01.13.2023    COMPLETE METABOLIC PANEL Internal 02.08.2024   COMPLETE BLOOD COUNT (CBC) Internal 02.08.2024    INTERNATIONAL NORMALIZED RATIO (INR) Internal 02.08.2024    HEPATITIS C ANTIBODY Internal 02.08.2024   HEPATITIS C VIRAL LOAD/PCR     HEPATITIS C GENOTYPE     HEPATITIS B SURFACE ANTIGEN Internal 02.08.2024   HEPATITIS B SURFACE ANTIBODY Internal 02.08.2024   HEPATITIS B DNA QUANT LEVEL     HEPATITIS B CORE ANTIBODY Internal 02.08.2024

## 2024-02-29 ENCOUNTER — ANCILLARY PROCEDURE (OUTPATIENT)
Dept: ULTRASOUND IMAGING | Facility: CLINIC | Age: 64
End: 2024-02-29
Attending: PHYSICIAN ASSISTANT
Payer: COMMERCIAL

## 2024-02-29 ENCOUNTER — ANCILLARY PROCEDURE (OUTPATIENT)
Dept: CT IMAGING | Facility: CLINIC | Age: 64
End: 2024-02-29
Attending: PHYSICIAN ASSISTANT
Payer: COMMERCIAL

## 2024-02-29 DIAGNOSIS — Z01.818 PRE-TRANSPLANT EVALUATION FOR KIDNEY TRANSPLANT: ICD-10-CM

## 2024-02-29 DIAGNOSIS — Z76.82 ORGAN TRANSPLANT CANDIDATE: ICD-10-CM

## 2024-02-29 DIAGNOSIS — I25.10 CARDIOVASCULAR DISEASE: ICD-10-CM

## 2024-02-29 DIAGNOSIS — E78.5 HYPERLIPIDEMIA: ICD-10-CM

## 2024-02-29 DIAGNOSIS — N18.6 END STAGE RENAL DISEASE (H): ICD-10-CM

## 2024-02-29 DIAGNOSIS — I10 ESSENTIAL HYPERTENSION: ICD-10-CM

## 2024-02-29 DIAGNOSIS — N02.B9 IGA NEPHROPATHY: ICD-10-CM

## 2024-02-29 PROCEDURE — 76770 US EXAM ABDO BACK WALL COMP: CPT | Mod: GC | Performed by: RADIOLOGY

## 2024-02-29 PROCEDURE — 74176 CT ABD & PELVIS W/O CONTRAST: CPT | Performed by: RADIOLOGY

## 2024-02-29 PROCEDURE — 93978 VASCULAR STUDY: CPT | Performed by: RADIOLOGY

## 2024-03-04 NOTE — TELEPHONE ENCOUNTER
MEDICAL RECORDS REQUEST   [facility]  Urology Clinic  [address] [place] [address]  PHONE: [phone]  Fax: [phone]        FUTURE VISIT INFORMATION                                                   Bao Keenan, : 1960 scheduled for future visit at [facility] Urology Clinic    APPOINTMENT INFORMATION:  Date: 2024  Provider:  Piyush Hahn NP  Reason for Visit/Diagnosis: microscopic hematuria, pre-kidney transplant evaluation, hx of IgA nephropathy     REFERRAL INFORMATION:  Referring provider:  Briseida Santoyo PA-C in [facility]      RECORDS REQUESTED FOR VISIT                                                     NOTES  STATUS/DETAILS   OFFICE NOTE from referring provider  yes, 2024 -- Briseida Santoyo PA-C in [facility]   MEDICATION LIST  yes   LABS     URINALYSIS (UA)  yes   images  yes, 2024 -- US RENAL  2024 -- CT ABD PELVIS  2024 -- XR CHEST     PRE-VISIT CHECKLIST      Joint diagnostic appointment coordinated correctly          (ensure right order & amount of time) Yes   RECORD COLLECTION COMPLETE Yes

## 2024-03-05 ENCOUNTER — TEAM CONFERENCE (OUTPATIENT)
Dept: TRANSPLANT | Facility: CLINIC | Age: 64
End: 2024-03-05

## 2024-03-05 ENCOUNTER — HOSPITAL ENCOUNTER (OUTPATIENT)
Dept: CARDIOLOGY | Facility: CLINIC | Age: 64
Discharge: HOME | End: 2024-03-05
Attending: INTERNAL MEDICINE | Admitting: INTERNAL MEDICINE
Payer: COMMERCIAL

## 2024-03-05 DIAGNOSIS — I10 ESSENTIAL HYPERTENSION: ICD-10-CM

## 2024-03-05 DIAGNOSIS — E78.5 HYPERLIPIDEMIA, UNSPECIFIED HYPERLIPIDEMIA TYPE: ICD-10-CM

## 2024-03-05 DIAGNOSIS — Z01.818 PRE-TRANSPLANT EVALUATION FOR KIDNEY TRANSPLANT: ICD-10-CM

## 2024-03-05 DIAGNOSIS — I25.10 CARDIOVASCULAR DISEASE: ICD-10-CM

## 2024-03-05 PROCEDURE — 93016 CV STRESS TEST SUPVJ ONLY: CPT | Performed by: INTERNAL MEDICINE

## 2024-03-05 PROCEDURE — 255N000002 HC RX 255 OP 636: Performed by: INTERNAL MEDICINE

## 2024-03-05 PROCEDURE — 93350 STRESS TTE ONLY: CPT | Mod: 26 | Performed by: INTERNAL MEDICINE

## 2024-03-05 PROCEDURE — 93325 DOPPLER ECHO COLOR FLOW MAPG: CPT | Mod: 26 | Performed by: INTERNAL MEDICINE

## 2024-03-05 PROCEDURE — 93321 DOPPLER ECHO F-UP/LMTD STD: CPT | Mod: 26 | Performed by: INTERNAL MEDICINE

## 2024-03-05 PROCEDURE — 93325 DOPPLER ECHO COLOR FLOW MAPG: CPT | Mod: TC

## 2024-03-05 PROCEDURE — 93018 CV STRESS TEST I&R ONLY: CPT | Performed by: INTERNAL MEDICINE

## 2024-03-05 RX ADMIN — PERFLUTREN 5 ML: 6.52 INJECTION, SUSPENSION INTRAVENOUS at 13:18

## 2024-03-05 NOTE — TELEPHONE ENCOUNTER
Image Review Meeting    ATTENDEES: Dr. Blanco    IMAGES REVIEWED: Ab/Pelv CT 2/29/24, iliac US 2/29/24, and renal US 2/29/24    DECISION: Vessels suitable for transplant, pt needs to continue to work on weight loss     INCIDENTALS: Yes:  Bilateral fat-containing inguinal hernias with extension of the  anterior bladder wall towards the left hernia

## 2024-03-07 ENCOUNTER — TELEPHONE (OUTPATIENT)
Dept: CARDIOLOGY | Facility: CLINIC | Age: 64
End: 2024-03-07
Payer: COMMERCIAL

## 2024-03-07 NOTE — TELEPHONE ENCOUNTER
Unable to reach patient; LM to follow up on ST results. Ramonita Schulz RN on 3/7/2024 at 10:13 AM    Reviewed results with patient and advised Dr. Vergara has cleared him for tx from a cardiac standpoint. No further follow up needed. Patient verbalized understanding. Ramonita Schulz RN on 3/7/2024 at 3:54 PM      ----- Message from Paul Vergara MD sent at 3/6/2024  4:23 PM CST -----  Regarding: RE: F/U on ST Results  Yes!  ----- Message -----  From: Ramonita Schulz RN  Sent: 3/6/2024   8:45 AM CST  To: Paul Vergara MD  Subject: FW: F/U on ST Results                            Morning!    Stress test results:   Interpretation Summary  Near maximal stress test, achieved 84% of the age predicted maximal heart  rate. Test stopped due to fatigue.     Normal blood pressure response to exercise.  No angina symptoms with exercise.  No ECG evidence of ischemia.  Normal global LV function with EF of approximately 55-60% at rest.  With exercise, LVEF increases to 60-65% and left ventricular cavity size  decreases appropriately.  No rest or stress-induced regional wall motion abnormalities.  Average functional capacity for age.     No significant valvular dysfunction noted on screening 2D and Doppler  examination.    You giving him the okay from a cardiac standpoint for tx?     Walker  ----- Message -----  From: Ramonita Schulz RN  Sent: 3/5/2024  12:00 AM CST  To: Ramonita Schulz RN  Subject: F/U on ST Results

## 2024-03-13 ENCOUNTER — DOCUMENTATION ONLY (OUTPATIENT)
Dept: TRANSPLANT | Facility: CLINIC | Age: 64
End: 2024-03-13
Payer: COMMERCIAL

## 2024-03-13 NOTE — PROGRESS NOTES
"HPI:  Bao Keenan is a 63 year old male with ESKD from IgA nephropathy on dialysis being seen for kidney transplant evaluation, gross hematuria and microscopic hematuria.    - gross hematuria x3 , 2020, 2016, 2012, referral made but never seen by urology    - UA showed RBC >182 on 2/8/24 with negative urine culture   - per notes from transplant team, previous UA showed ~6-10 RBCs, no lab records found   - on eliquis 5 BID for DVT history in Jan 2023    - no pain, no UTIs, no urinary concerns  - no smoking history  - no personal or family history of cancer    -cr 4.36  -A1c 6%      Reviewed previous notes from Briseida Santoyo PA-C from Nephrology service at [facility]    Exam:  /63   Pulse 86   Ht 1.702 m (5' 7\")   Wt 117 kg (258 lb)   BMI 40.41 kg/m    General: age-appropriate appearing male in NAD sitting in an exam chair  HEENT: Head AT/NC, EOMI, CN Grossly intact.  Resp: no respiratory distress  CV: heart rate regular  Abdomen: Degree of obesity is none. Abdomen is soft and nontender.   Neuro: grossly non focal. Normal reflexes  Skin: clear of rashes or ecchymoses. No sacral decubitus ulcer.  Motor: excellent strength throughout    Review of Imaging:  The following imaging exams were independently viewed and interpreted by me and discussed with patient:    2/8/24 FARZAD  IMPRESSION:  1. Left superior pole renal stone.  2. Left simple renal cysts measuring up to 1.2 cm. Right simple renal  cysts measuring up to 1.3 cm. No follow-up recommended.     2/29/24  Ct abd pelvis w/o  IMPRESSION:   1.  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 Slight decrease in splenomegaly with multifocal areas of  infarcts again noted.  2.  Kidneys are slightly atrophic with bilateral cysts suspected.  3.  Stable presumed intraparenchymal lymph node along the right major  fissure.  4.  Bilateral " fat-containing inguinal hernias with extension of the  anterior bladder wall towards the left hernia.    I have personally reviewed the examination and initial interpretation  and I agree with the findings.    Review of Labs:  The following labs were reviewed by me and discussed with the patient:  No results found for this or any previous visit (from the past 720 hour(s)).    Cr. 4.36    Assessment & Plan   Gross hematuria   Microscopic hematuria   Kidney transplant eval    Urine cytology ordered today     Schedule cystoscopy with urologist at the earliest convenience      Piyush Hahn NP  [facility] UROLOGY [facility] [place]    ==========================      Additional Coding Information:    Problems:  4 -- two or more stable chronic illnesses    Data Reviewed  Review of external notes as documented above     Tests ordered: urine cytology  Level of risk:  3 -- low risk (e.g., OTC medication or observation, minor surgery without risks)    Time spent:  I spent a total of 35 minutes on the day of the visit.   Time spent by me doing chart review, history and exam, documentation and further activities per the note

## 2024-03-14 ENCOUNTER — TELEPHONE (OUTPATIENT)
Dept: UROLOGY | Facility: CLINIC | Age: 64
End: 2024-03-14

## 2024-03-14 ENCOUNTER — OFFICE VISIT (OUTPATIENT)
Dept: UROLOGY | Facility: CLINIC | Age: 64
End: 2024-03-14
Payer: COMMERCIAL

## 2024-03-14 ENCOUNTER — PRE VISIT (OUTPATIENT)
Dept: UROLOGY | Facility: CLINIC | Age: 64
End: 2024-03-14

## 2024-03-14 VITALS
DIASTOLIC BLOOD PRESSURE: 63 MMHG | WEIGHT: 258 LBS | HEIGHT: 67 IN | HEART RATE: 86 BPM | BODY MASS INDEX: 40.49 KG/M2 | SYSTOLIC BLOOD PRESSURE: 112 MMHG

## 2024-03-14 DIAGNOSIS — R31.0 GROSS HEMATURIA: ICD-10-CM

## 2024-03-14 DIAGNOSIS — R31.29 MICROSCOPIC HEMATURIA: Primary | ICD-10-CM

## 2024-03-14 PROCEDURE — 99203 OFFICE O/P NEW LOW 30 MIN: CPT

## 2024-03-14 PROCEDURE — 88112 CYTOPATH CELL ENHANCE TECH: CPT | Mod: 26 | Performed by: PATHOLOGY

## 2024-03-14 PROCEDURE — 88112 CYTOPATH CELL ENHANCE TECH: CPT | Mod: TC

## 2024-03-14 ASSESSMENT — PAIN SCALES - GENERAL: PAINLEVEL: NO PAIN (0)

## 2024-03-14 NOTE — TELEPHONE ENCOUNTER
Health Call Center    Phone Message    May a detailed message be left on voicemail: yes     Reason for Call: Patient is unavailable for scheduled cystoscopy appointment that is scheduled with Jose Manuel MD 04/03/2024 in [place] due to other medical appointments.     Please call patient back.    Patient is available Tues, and Thurs: anytime. Mon, Wed, Fri: at 10:30AM if in [place] location, or 11:00AM other locations.    Action Taken: Other: [place] - Urology    Travel Screening: Not Applicable

## 2024-03-14 NOTE — NURSING NOTE
"Chief Complaint   Patient presents with    Consult For     Kidney transplant         Blood pressure 112/63, pulse 86, height 1.702 m (5' 7\"), weight 117 kg (258 lb). Body mass index is 40.41 kg/m .    Patient Active Problem List   Diagnosis    Portal hypertension (H)    S/P TIPS (transjugular intrahepatic portosystemic shunt)    End stage renal disease (H)    IgA nephropathy       No Known Allergies    Current Outpatient Medications   Medication Sig Dispense Refill    B Complex-C-Zn-Folic Acid (DIALYVITE/ZINC) TABS Take 1 tablet by mouth every morning      calcium acetate (PHOSLO) 667 MG CAPS capsule calcium acetate(phosphat bind) 667 mg      ELIQUIS ANTICOAGULANT 2.5 MG tablet Take 1 tablet by mouth      Ferrous Sulfate (IRON SUPPLEMENT PO) Take 325 mg by mouth 2 times daily (with meals)       rosuvastatin (CRESTOR) 5 MG tablet Take 5 mg by mouth         Social History     Tobacco Use    Smoking status: Never    Smokeless tobacco: Never   Substance Use Topics    Alcohol use: No    Drug use: No       Evan Sutherland MA  3/14/2024  1:44 PM     "

## 2024-03-14 NOTE — LETTER
"3/14/2024       RE: Bao Keenan  [address] [address] [facility] [address]     Dear Colleague,    Thank you for referring your patient, Bao Keenan, to the [facility] [place] at [facility]. Please see a copy of my visit note below.    HPI:  Bao Keenan is a 63 year old male with ESKD from IgA nephropathy on dialysis being seen for kidney transplant evaluation, gross hematuria and microscopic hematuria.    - gross hematuria x3 , 2020, 2016, 2012, referral made but never seen by urology    - UA showed RBC >182 on 2/8/24 with negative urine culture   - per notes from transplant team, previous UA showed ~6-10 RBCs, no lab records found   - on eliquis 5 BID for DVT history in Jan 2023    - no pain, no UTIs, no urinary concerns  - no smoking history  - no personal or family history of cancer    -cr 4.36  -A1c 6%      Reviewed previous notes from Briseida Santoyo PA-C from Nephrology service at [facility]    Exam:  /63   Pulse 86   Ht 1.702 m (5' 7\")   Wt 117 kg (258 lb)   BMI 40.41 kg/m    General: age-appropriate appearing male in NAD sitting in an exam chair  HEENT: Head AT/NC, EOMI, CN Grossly intact.  Resp: no respiratory distress  CV: heart rate regular  Abdomen: Degree of obesity is none. Abdomen is soft and nontender.   Neuro: grossly non focal. Normal reflexes  Skin: clear of rashes or ecchymoses. No sacral decubitus ulcer.  Motor: excellent strength throughout    Review of Imaging:  The following imaging exams were independently viewed and interpreted by me and discussed with patient:    2/8/24 FARZAD  IMPRESSION:  1. Left superior pole renal stone.  2. Left simple renal cysts measuring up to 1.2 cm. Right simple renal  cysts measuring up to 1.3 cm. No follow-up recommended.     2/29/24  Ct abd pelvis w/o  IMPRESSION:   1.  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 Slight decrease in splenomegaly with multifocal areas of  infarcts again noted.  2.  Kidneys are slightly atrophic with bilateral cysts suspected.  3.  Stable presumed intraparenchymal lymph node along the right major  fissure.  4.  Bilateral fat-containing inguinal hernias with extension of the  anterior bladder wall towards the left hernia.    I have personally reviewed the examination and initial interpretation  and I agree with the findings.    Review of Labs:  The following labs were reviewed by me and discussed with the patient:  No results found for this or any previous visit (from the past 720 hour(s)).    Cr. 4.36    Assessment & Plan  Gross hematuria   Microscopic hematuria   Kidney transplant eval    Urine cytology ordered today     Schedule cystoscopy with urologist at the earliest convenience      Piyush Hahn NP  [facility]OLOG[facility] [place]    ==========================      Additional Coding Information:    Problems:  4 -- two or more stable chronic illnesses    Data Reviewed  Review of external notes as documented above     Tests ordered: urine cytology  Level of risk:  3 -- low risk (e.g., OTC medication or observation, minor surgery without risks)    Time spent:  I spent a total of 35 minutes on the day of the visit.   Time spent by me doing chart review, history and exam, documentation and further activities per the note

## 2024-03-14 NOTE — TELEPHONE ENCOUNTER
Message sent to scheduling team with request to contact patient to assist in rescheduling cysto with Dr. Manuel.    Beatriz Pate RN, BSN

## 2024-03-15 ENCOUNTER — TELEPHONE (OUTPATIENT)
Dept: UROLOGY | Facility: CLINIC | Age: 64
End: 2024-03-15
Payer: COMMERCIAL

## 2024-03-15 DIAGNOSIS — R31.0 GROSS HEMATURIA: ICD-10-CM

## 2024-03-15 DIAGNOSIS — R31.29 MICROSCOPIC HEMATURIA: Primary | ICD-10-CM

## 2024-03-15 LAB
PATH REPORT.COMMENTS IMP SPEC: NORMAL
PATH REPORT.FINAL DX SPEC: NORMAL
PATH REPORT.GROSS SPEC: NORMAL
PATH REPORT.MICROSCOPIC SPEC OTHER STN: NORMAL
PATH REPORT.RELEVANT HX SPEC: NORMAL

## 2024-03-15 NOTE — TELEPHONE ENCOUNTER
Per chart review, patient is scheduled for cysto with Dr. Bonner on 3/26/24 at 2:30pm with lab appointment for UA/UC prior.    Beatriz Pate RN, BSN

## 2024-03-15 NOTE — TELEPHONE ENCOUNTER
3/15 Spoke with patient directly. Informed him of rescheduled appointment and confirmed time and date for 3/26/2024 at 2:10 PM for a Urine Sample and 2:30 Cystoscopy with Dr. Bonner. Confirmed location in [place] with patient. He is aware of both appointments.     Closing encounter.     Mirella padron Complex   Dermatology, Surgery, Urology  [facility]

## 2024-03-15 NOTE — TELEPHONE ENCOUNTER
[facility] Call Center    Phone Message    May a detailed message be left on voicemail: yes     Reason for Call: Other: Patient called in to let us know that the 3/26/24 2:30pm appointment works that he received a call about. Due to templating, writer is unable to schedule this. Please reach out to patient as he want's to take the appointment being offered at      Action Taken: Other: Urology    Travel Screening: Not Applicable

## 2024-03-15 NOTE — TELEPHONE ENCOUNTER
[facility] Call Center    Phone Message    May a detailed message be left on voicemail: yes     Reason for Call: Pt needs to reschedule 4/3 cysto   Pt thought he'd get this done yesterday and needs done before transplant so needing this done ASAP. Pt stated he can go anywhere to have this done. Pt has dialysis Mon, Wed and Fri and cannot do mornings.  Pt stated he'll also contact coordinator    Please call pt to schedule thank you    Action Taken: Message routed to:  Other: Uro    Travel Screening: Not Applicable

## 2024-03-15 NOTE — TELEPHONE ENCOUNTER
3/15 Called patient and left voicemail. Provided patient with [phone] as a call back number. Patient can reschedule to next available with Dr. Manuel on we Wednesday in [place] or reschedule to Dr. Bonner on a Tuesday or Thursday in [place].     Please add on lab appointment 10 minutes prior to Cystoscopy for a urine sample. If lab orders are not in, please schedule lab appointment and route lab order request to MG Nirali brown.     Mirella padron Complex   Dermatology, Surgery, Urology  [facility] and Surgery [facility]

## 2024-03-15 NOTE — TELEPHONE ENCOUNTER
3/15 Called patient and left voicemail. Provided patient with offered appointment on 3/26/2024 at 2:30 PM in [place] with Dr. Bonner. If time/date does not work with patient we will need to look elsewhere to have Cystoscopy completed.     Mirella padron Complex   Dermatology, Surgery, Urology  [facility] [place]

## 2024-03-20 ENCOUNTER — TELEPHONE (OUTPATIENT)
Dept: TRANSPLANT | Facility: CLINIC | Age: 64
End: 2024-03-20
Payer: COMMERCIAL

## 2024-03-20 NOTE — TELEPHONE ENCOUNTER
Called pt to touch base on evaluation status as follow up to email I received. We discussed his upcoming visits/tests and that once those are completed we will review his case with the Committee for listing.  He stated his son is an interested donor and had questions on his next steps, I encouraged him to have his son call his coordinator to arrange for any additional testing. He had no further questions.

## 2024-03-21 ENCOUNTER — DOCUMENTATION ONLY (OUTPATIENT)
Dept: TRANSPLANT | Facility: CLINIC | Age: 64
End: 2024-03-21
Payer: COMMERCIAL

## 2024-03-26 ENCOUNTER — LAB (OUTPATIENT)
Dept: LAB | Facility: CLINIC | Age: 64
End: 2024-03-26
Payer: COMMERCIAL

## 2024-03-26 ENCOUNTER — OFFICE VISIT (OUTPATIENT)
Dept: UROLOGY | Facility: CLINIC | Age: 64
End: 2024-03-26
Payer: COMMERCIAL

## 2024-03-26 VITALS — SYSTOLIC BLOOD PRESSURE: 118 MMHG | HEART RATE: 84 BPM | DIASTOLIC BLOOD PRESSURE: 65 MMHG

## 2024-03-26 DIAGNOSIS — R31.29 MICROSCOPIC HEMATURIA: Primary | ICD-10-CM

## 2024-03-26 DIAGNOSIS — R31.0 GROSS HEMATURIA: ICD-10-CM

## 2024-03-26 DIAGNOSIS — R31.29 MICROSCOPIC HEMATURIA: ICD-10-CM

## 2024-03-26 LAB
ALBUMIN UR-MCNC: 30 MG/DL
APPEARANCE UR: CLEAR
BILIRUB UR QL STRIP: NEGATIVE
COLOR UR AUTO: YELLOW
GLUCOSE UR STRIP-MCNC: NEGATIVE MG/DL
HGB UR QL STRIP: ABNORMAL
KETONES UR STRIP-MCNC: NEGATIVE MG/DL
LEUKOCYTE ESTERASE UR QL STRIP: NEGATIVE
NITRATE UR QL: NEGATIVE
PH UR STRIP: 7 [PH] (ref 5–7)
RBC #/AREA URNS AUTO: ABNORMAL /HPF
SKIP: ABNORMAL
SP GR UR STRIP: 1.01 (ref 1–1.03)
UROBILINOGEN UR STRIP-MCNC: NORMAL MG/DL
WBC #/AREA URNS AUTO: ABNORMAL /HPF

## 2024-03-26 PROCEDURE — 52000 CYSTOURETHROSCOPY: CPT | Performed by: UROLOGY

## 2024-03-26 PROCEDURE — 99203 OFFICE O/P NEW LOW 30 MIN: CPT | Mod: 25 | Performed by: UROLOGY

## 2024-03-26 PROCEDURE — 81001 URINALYSIS AUTO W/SCOPE: CPT

## 2024-03-26 NOTE — NURSING NOTE
Bao Keenan's goals for this visit include:   Chief Complaint   Patient presents with    Cystoscopy     Microscopic Hematuria       He requests these members of his care team be copied on today's visit information:     PCP: Stephani Eric    Referring Provider:  Piyush Hahn NP  [address] [facility], [address] [place] [id number]    /65 (BP Location: Right arm, Patient Position: Sitting, Cuff Size: Adult Large)   Pulse 84     Do you need any medication refills at today's visit?     Codi Peacock MA on 3/26/2024 at 2:24 PM

## 2024-03-26 NOTE — PROGRESS NOTES
[facility]   CHIEF COMPLAINT   It was my pleasure to see Bao Keenan who is a 63 year old male for follow-up of microscopic hematuria.      HPI   Bao Keenan is a very pleasant 63 year old male     Initially seen by Piyush Hahn NP - 3/14/2024:  Bao Keenan is a 63 year old male with ESKD from IgA nephropathy on dialysis being seen for kidney transplant evaluation, gross hematuria and microscopic hematuria.     - gross hematuria x3 , 2020, 2016, 2012, referral made but never seen by urology     - UA showed RBC >182 on 2/8/24 with negative urine culture   - per notes from transplant team, previous UA showed ~6-10 RBCs, no lab records found   - on eliquis 5 BID for DVT history in Jan 2023     - no pain, no UTIs, no urinary concerns  - no smoking history  - no personal or family history of cancer     -cr 4.36  -A1c 6%    TODAY 3/27/2024:  He presents today for cystoscopy  Being worked up for a renal transplant    PHYSICAL EXAM  Patient is a 63 year old  male   Vitals: Blood pressure 118/65, pulse 84.  There is no height or weight on file to calculate BMI.  General Appearance Adult:   Alert, no acute distress, oriented  HENT: throat/mouth:normal, good dentition  Lungs: no respiratory distress, or pursed lip breathing  Neuro: Alert, oriented, speech and mentation normal  Psych: affect and mood normal  Gait: Normal  : deferred to cystoscopy    UA RESULTS:  Recent Labs   Lab Test 03/26/24  1357   COLOR Yellow   APPEARANCE Clear   URINEGLC Negative   URINEBILI Negative   URINEKETONE Negative   SG 1.013   UBLD Trace*   URINEPH 7.0   PROTEIN 30*   NITRITE Negative   LEUKEST Negative   RBCU 5-10*   WBCU 0-5      Final Diagnosis   Specimen A     A. URINE, VOIDED, URINE CYTOLOGY:  Interpretation:  Negative for High Grade Urothelial Carcinoma        All pertinent imaging reviewed:    All imaging studies reviewed by me.  I personally reviewed these imaging films.  A formal report from radiology will follow.    CT  ABD/PEL 2/29/2024:  IMPRESSION:   1.  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 Slight decrease in splenomegaly with multifocal areas of  infarcts again noted.  2.  Kidneys are slightly atrophic with bilateral cysts suspected.  3.  Stable presumed intraparenchymal lymph node along the right major  fissure.  4.  Bilateral fat-containing inguinal hernias with extension of the  anterior bladder wall towards the left hernia.    ASSESSMENT and PLAN  63-year-old man with complex medical history including ESRD from IgA nephropathy on dialysis and being evaluated for a renal transplant with history of prior gross hematuria and microscopic hematuria    History of gross and microscopic hematuria  - I reviewed his labs which are notable for persistent microscopic hematuria  - I reviewed his urine cytology which was negative for malignant cells  - I reviewed his CT scan with no upper tract abnormalities  - Cystoscopy today with no evidence of intravesical abnormalities concerning for malignancy  - No contraindication to renal transplant at this time no further workup needed for his gross hematuria      Time spent: 20 minutes spent on the date of the encounter doing chart review, history and exam, documentation and further activities as noted above.  This was in addition to cystoscopy time     Gama Bonner MD   Urology  [facility]  [facility] Phone: [phone]  [facility] Phone: [phone]

## 2024-03-27 NOTE — PROCEDURES
CYSTOSCOPY PROCEDURE NOTE:    Bao Keenan is a 63 year old male  who presents with prior gross and more recent microscopic hematuria for cystoscopy.    Pt ID verified with patient: Yes     Procedure verified with patient: [email or web address]     Procedure confirmed with physician and support staff: Yes     Consent form confirmed with physician and support staff.    Sign In  History and Physical Exam reviewed.  Informed Consent Discussed: Yes   Sign in Communication: Yes   Time Out:  Team Confirms the Correct Patient, Correct Procedure; Yes , Correct Site and Site Marking, Correct Position (if applicable).    Affirmation of Time Out: Yes   Sign Out:  Sign Out Discussion: Yes   Physician: Gama Bonner MD    A urinalysis was performed revealing no evidence of infection.    The benefits, risks, alternatives of the cystoscopy procedure and personnel were discussed with the patient. The verbal consent was obtained and the patient agrees to proceed.      Description of procedure:   After fully informed, voluntary consent was obtained, the patient was brought into the procedure room, identified and placed in a supine position on the cystoscopy table.  The groin/scrotum were prepped with betadine and draped in a sterile fashion.  Urojet lidocaine gel was introduced.  A 15F flexible cystoscope was inserted into the urethra, and the bladder and urethra wereexamined in a systematic manner.  The patient tolerated the procedure well and there were no complications.      Cystoscopic findings:  The urethra was normal without strictures.  The prostate was 3cm long and demonstrated mild bilobar hypertrophy.  There was no median lobe.  The external sphincter coapted normally and the bladder neck was normal. The bladder was  entered and careful pan endoscopy was carried out. The posterior, superior and lateral walls and dome of the bladder were all well visualized and the scope was retroflexed upon itself..  There was very mild trabeculation.   There were no neoplasms, stones, or diverticula identifed.  The ureteric orifices were normal in position and number and effluxing clear urine.    Assessment/Plan:   Bao Keenan is a 63 year old male with a history of prior gross and more recent microscopic hematuria     -See clinic note      Gama Bonner MD

## 2024-04-05 DIAGNOSIS — K76.6 PORTAL HYPERTENSION (H): ICD-10-CM

## 2024-04-05 DIAGNOSIS — Z95.828 S/P TIPS (TRANSJUGULAR INTRAHEPATIC PORTOSYSTEMIC SHUNT): Primary | ICD-10-CM

## 2024-04-08 DIAGNOSIS — I85.00 ESOPHAGEAL VARICES WITHOUT BLEEDING, UNSPECIFIED ESOPHAGEAL VARICES TYPE (H): Primary | ICD-10-CM

## 2024-04-08 NOTE — PROGRESS NOTES
"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     # Hx of Portal/splenic vein thrombosis in the setting of Protein C deficiency  # Hx of esophageal variceal bleeding s/p banding and gastric varices s/p embolization (4/2014)  Longstanding history of non-cirrhotic portal hypertension related to portal/splenic vein thrombosis in the setting of Protein C deficiency, initially diagnosed 6/2009, complicated by remote variceal bleeding (2014), splenomegaly, thrombocytopenia, and coagulopathy (although also on anticoagulation). Mr. Keenan requires further evaluation to determine 1) whether or not his PVT is still presents, and 2) repeat EGD for variceal screening as well as EUS to measure his portal pressures to better assess his current portal hypertension status. To further evaluate for PVT, will obtain abdominal ultrasound with doppler. Ideally, we would obtain a contrasted CT study to evaluate for this, but given he makes significant urine, Transplant Nephrology prefers we do not use contrast. If present, given this clot is chronic there is likely not a role for thrombectomy and also likely unnecessary prior to a renal transplant. For assessment of his portal pressures, we would like to evaluate via endoscopic ultrasound-guided measurement for the best accuracy over the transjugular approach.     # MASLD  Mr. Keenan has had longstanding MASLD and has multiple related metabolic conditions " including obesity, prediabetes, HLD. Mr. Keenan has been working on weight loss through diet. Encouraged him to work with his primary care doctor regarding health weight management with lifestyle modifications and discussion of management of his prediabetes.  We discussed that if the patient were to undergo transplantation he is at risk of gaining weight in the setting of immunosuppression and prednisone use and that he would benefit from maintaining a healthy weight pretransplant    # H pylori infection  Found to have H pylori after 5/2022 hospitalization for bleeding presumed to be secondary to PHG. Reportedly treated with triple therapy, but does not appear there was a repeat test to confirm eradication.    Plan  - abdominal ultrasound with doppler for PVT assessment  - EGD for variceal screening along with EUS for portal pressure gradient measurement  - discuss weight management and prediabetes with primary care provider.  We discussed the importance of increasing his exercise/mobility.  He would benefit from reducing his portion sizes, reducing sugars, or carbohydrates and processed foods.   - H pylori stool antigen test to confirm eradication.     RTC pending above evaluation     This patient was seen and discussed with Dr. Tomlinson.    Tiffani Abrams MD  PGY-3  Internal Medicine     -------------------------  Patient with a history of portal and splenic vein thrombosis in the setting of protein C deficiency.  The patient has a history of esophageal variceal bleeding in 2014 + gastric varices.  The patient underwent partial embolization of the spleen plus shunts and embolization of a varix emptying into the left renal vein in 2014.  Since that time he has had no further variceal bleeding.  He underwent an EGD in January 2023 with no evidence of varices.  The patient is currently on anticoagulation in the setting of protein C deficiency as well as history of VTE and portal vein clot.  Based on recent imaging it is  unclear if the patient's portal vein clot is persistent.  Discussed the patient with Dr. Tucker (transplant nephrology) in clinic -given the patient continues to make a substantial amount of urine it would not be beneficial to do a CT with contrast to evaluate the portal vein thus we will opt for an ultrasound with Doppler.  In order to assess if the patient has clinically significant portal hypertension we will plan to do an EUS with portal pressure measurements as well as an EGD to evaluate for varices; I have sent a message to the specialist/advanced endoscopist Dr. Corrales to get his input on if this is feasible.     Rest per resident/fellow/RASHAUN note from today.    The patient was seen and examined with the resident/fellow physician or RASHAUN.  We have discussed the patient in detail and I agree with the findings, assessment, and plan as documented when this note was cosigned on this day.     Kim Tomlinson M.D.   of Medicine  [facility]  Advanced/Transplant Hepatology   ---------------------------    I spent 60 minutes on this encounter performing the following: reviewing the patient's medical record (clinic visits, hospital records, lab results, imaging and procedural documentation), history taking, physical exam and documentation on the date of the encounter. I also spent part of the time in coordination of care and counseling.    HPI:  Mr. Keenan presents to Hepatology clinic today to undergo pre-kidney transplant evaluation and optimization related to his liver disease. He has an extensive liver history with non-cirrhotic portal hypertension secondary to PVT in the setting of Protein C deficiency, and also has evidence of MASLD.     Summary of Liver Disease  - 2005: DVT (right leg)   - 6/2009: Presented with abdominal pain and CT scan was noted for acute thrombus in the portal and splenic veins.  Hypercoagulable workup at that time demonstrated a low protein C.  Protein C  and Antithrombin were normal.  Factor V Leiden and prothrombin mutation were normal/negative.  Lupus anticoagulant was negative on 2 occasions.  Cardiolipin antibody was negative.  - 4/15/2014: Admitted with hematochezia.  Upper endoscopy was notable for portal hypertension and bleeding varices that required banding.  CT scan at that time noted cavernous transformation of the portal vein with multiple collaterals and evidence of portal hypertension with splenomegaly and varices.  - 5/2014: Seen by a hematologist/oncologist and restarted on anticoagulation  - 2014: IVC filter placed (since removed 12/2014)  - 9/10/2014: partial embolization of the spleen + shunts and embolization of the varix emptying into the left renal vein with microcoils and Gelfoam  - 5/2022: admitted for hematemesis felt to be secondary to bleeding PHG. Also found to be H pylori and was reportedly treated with triple therapy. Cannot find records of stool antigen testing.   - 1/11/2023: admission for FELICIANO on CKD and during the hospitalization had hematochezia with EGD/colonoscopy: EGD unremarkable and colonoscopy with one non-bleeding AVM s/p ablation and oozing internal hemorrhoids in the setting of a supratheraputic INR    Today, the patient reports that he is overall doing well. He denies any problems with abdominal distension, lower extremity edema, lethargy/confusion, jaundice, melena, hematemesis, or hematochezia. He is unsure whether or not he still has the clot in his PVT.     Regarding HD, he dialyzes on MWF. His dry weight is 116 kg. He reportedly makes significant amount of urine. He is eager to be evaluated for his kidney transplant as his son is planning to donate his kidney for him and has a busy schedule coming up this summer working in [email or web address] in [place]. Mr. Keenan has been working on weight loss. At his heaviest, he was 332 lbs. At his least, he was 220 lbs. Since about 2/2024, he has been in the 250s. He primarily loses weight  through diet, does not follow an organized exercise regimen.       Medical hx Surgical hx   Past Medical History:   Diagnosis Date    Anemia     Antiplatelet or antithrombotic long-term use     Arthritis     Right Knee    Coagulation disorder (H24)     Protein C deficiency    Gastro-oesophageal reflux disease     History of blood transfusion     Hypertension     Liver disease     non alcoholic fatty liver    Portal hypertension (H)     Renal disease     CKD    Thrombosis of leg     +DVT      Past Surgical History:   Procedure Laterality Date    BIOPSY      2014    COLONOSCOPY      ENT SURGERY      tonsils    EYE SURGERY      lasik    IR IVC FILTER PLACEMENT      IR IVC FILTER REMOVAL  12/17/2014    TIPS PROCEDURE            Medications  Current Outpatient Medications   Medication Sig Dispense Refill    B Complex-C-Zn-Folic Acid (DIALYVITE/ZINC) TABS Take 1 tablet by mouth every morning      calcium acetate (PHOSLO) 667 MG CAPS capsule calcium acetate(phosphat bind) 667 mg      ELIQUIS ANTICOAGULANT 2.5 MG tablet Take 1 tablet by mouth      Ferrous Sulfate (IRON SUPPLEMENT PO) Take 325 mg by mouth 2 times daily (with meals)       rosuvastatin (CRESTOR) 5 MG tablet Take 5 mg by mouth         Allergies  No Known Allergies    Family hx Social hx   Family History   Problem Relation Age of Onset    Kidney Disease No family hx of       Social History     Tobacco Use    Smoking status: Never    Smokeless tobacco: Never   Substance Use Topics    Alcohol use: No    Drug use: No     - 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    Examination  /68 (BP Location: Right arm, Patient Position: Sitting, Cuff Size: Adult Large)   Pulse 85   Temp 98.1  F (36.7  C) (Oral)   Wt 115.2 kg (254 lb)   SpO2 96%   BMI 39.78 kg/m     Body mass index is 39.78 kg/m .    Gen-NAD  Eye- EOMI  ENT- MMM, normal  oropharynx  CVS- regular rate, WWP  RS- breathing comfortably on RA  Abd- soft, non-tender, non-distended  Extr- no lower extremity edema bilaterally  Neuro- A+Ox3  Skin- no rash or jaundice  Psych- normal mood    Laboratory  CBC RESULTS:   Recent Labs   Lab Test 04/11/24  1154   WBC 6.7   RBC 4.54   HGB 14.0   HCT 42.7   MCV 94   MCH 30.8   MCHC 32.8   RDW 13.3   *     Last Comprehensive Metabolic Panel:  Lab Results   Component Value Date     04/11/2024    POTASSIUM 5.3 04/11/2024    CHLORIDE 99 04/11/2024    CO2 27 04/11/2024    ANIONGAP 15 04/11/2024     (H) 04/11/2024    BUN 37.8 (H) 04/11/2024    CR 4.90 (H) 04/11/2024    GFRESTIMATED 13 (L) 04/11/2024    RAQUEL 10.1 04/11/2024     Liver Function Studies -   Recent Labs   Lab Test 04/11/24  1154   PROTTOTAL 8.7*   ALBUMIN 4.7   BILITOTAL 0.8   ALKPHOS 69   AST 33   ALT 17         Radiology  CT Abdomen Pelvis w/o Contrast 2/2024  1.  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 Slight decrease in splenomegaly with multifocal areas of  infarcts again noted.  2.  Kidneys are slightly atrophic with bilateral cysts suspected.  3.  Stable presumed intraparenchymal lymph node along the right major  fissure.  4.  Bilateral fat-containing inguinal hernias with extension of the  anterior bladder wall towards the left hernia.    CT Abdomen Pelvis w/Contrast 10/2014  1. Splenorenal embolization changes are again seen. Multiple  associated splenic infarctions.  2. Slight decrease in splenomegaly. Multiple portal-systemic  collateral vessels are again seen.  3. Unchanged 0.8 cm pleural-based nodule along the right major  fissure. Continued followup to document two-year stability is  recommended.  4. Unchanged mild herniation of the urinary bladder the left inguinal  canal.    CT Abdomen Pelvis w/Contrast 7/2014  1. Innumerable perisplenic, gastric, and gastrorenal  varicose veins.  Multiple varicose veins in gastric fundus varices with scattered  distal esophageal varices.  2. 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  3. Splenomegaly.  4. Infrarenal IVC filter.  5. A 6 cm nodule in the right lower. Recommend followup with low dose  chest CT in 12 months as per Fleischner Society criteria.    Endoscopy  EGD 1/2023      EGD 5/27/2022  - Normal esophagus.        - Bleeding gastropathy, suspect portal HTN gastropathy.        - Normal examined duodenum.     EGD 12/31/2020  - Normal esophagus.        - healed , clips present        - Normal examined duodenum.     EGD 10/15/2020  - Non-bleeding diminutive esophageal varices.        - Oozing gastric ulcer with oozing hemorrhage (Sundeep Class Ib).        Injected. Clips were placed.        - Duodenitis.        - No specimens collected.

## 2024-04-11 ENCOUNTER — PRE VISIT (OUTPATIENT)
Dept: GASTROENTEROLOGY | Facility: CLINIC | Age: 64
End: 2024-04-11

## 2024-04-11 ENCOUNTER — TELEPHONE (OUTPATIENT)
Dept: TRANSPLANT | Facility: CLINIC | Age: 64
End: 2024-04-11

## 2024-04-11 ENCOUNTER — LAB (OUTPATIENT)
Dept: LAB | Facility: CLINIC | Age: 64
End: 2024-04-11
Payer: COMMERCIAL

## 2024-04-11 ENCOUNTER — OFFICE VISIT (OUTPATIENT)
Dept: GASTROENTEROLOGY | Facility: CLINIC | Age: 64
End: 2024-04-11
Attending: PHYSICIAN ASSISTANT
Payer: COMMERCIAL

## 2024-04-11 VITALS
OXYGEN SATURATION: 96 % | TEMPERATURE: 98.1 F | BODY MASS INDEX: 39.78 KG/M2 | SYSTOLIC BLOOD PRESSURE: 111 MMHG | WEIGHT: 254 LBS | HEART RATE: 85 BPM | DIASTOLIC BLOOD PRESSURE: 68 MMHG

## 2024-04-11 DIAGNOSIS — I10 ESSENTIAL HYPERTENSION: ICD-10-CM

## 2024-04-11 DIAGNOSIS — Z95.828 S/P TIPS (TRANSJUGULAR INTRAHEPATIC PORTOSYSTEMIC SHUNT): ICD-10-CM

## 2024-04-11 DIAGNOSIS — K76.0 NAFLD (NONALCOHOLIC FATTY LIVER DISEASE): ICD-10-CM

## 2024-04-11 DIAGNOSIS — N18.6 END STAGE RENAL DISEASE (H): ICD-10-CM

## 2024-04-11 DIAGNOSIS — I25.10 CARDIOVASCULAR DISEASE: ICD-10-CM

## 2024-04-11 DIAGNOSIS — Z76.82 ORGAN TRANSPLANT CANDIDATE: ICD-10-CM

## 2024-04-11 DIAGNOSIS — I81 PORTAL VEIN THROMBOSIS: Primary | ICD-10-CM

## 2024-04-11 DIAGNOSIS — K76.6 PORTAL HYPERTENSION (H): ICD-10-CM

## 2024-04-11 DIAGNOSIS — A04.8 H. PYLORI INFECTION: ICD-10-CM

## 2024-04-11 DIAGNOSIS — Z01.818 PRE-TRANSPLANT EVALUATION FOR KIDNEY TRANSPLANT: ICD-10-CM

## 2024-04-11 LAB
AFP SERPL-MCNC: 2.1 NG/ML
ALBUMIN SERPL BCG-MCNC: 4.7 G/DL (ref 3.5–5.2)
ALP SERPL-CCNC: 69 U/L (ref 40–150)
ALT SERPL W P-5'-P-CCNC: 17 U/L (ref 0–70)
ANION GAP SERPL CALCULATED.3IONS-SCNC: 15 MMOL/L (ref 7–15)
AST SERPL W P-5'-P-CCNC: 33 U/L (ref 0–45)
BILIRUB DIRECT SERPL-MCNC: 0.28 MG/DL (ref 0–0.3)
BILIRUB SERPL-MCNC: 0.8 MG/DL
BUN SERPL-MCNC: 37.8 MG/DL (ref 8–23)
CALCIUM SERPL-MCNC: 10.1 MG/DL (ref 8.8–10.2)
CHLORIDE SERPL-SCNC: 99 MMOL/L (ref 98–107)
CHOLEST SERPL-MCNC: 129 MG/DL
CREAT SERPL-MCNC: 4.9 MG/DL (ref 0.67–1.17)
DEPRECATED HCO3 PLAS-SCNC: 27 MMOL/L (ref 22–29)
EGFRCR SERPLBLD CKD-EPI 2021: 13 ML/MIN/1.73M2
ERYTHROCYTE [DISTWIDTH] IN BLOOD BY AUTOMATED COUNT: 13.3 % (ref 10–15)
GLUCOSE SERPL-MCNC: 103 MG/DL (ref 70–99)
HCT VFR BLD AUTO: 42.7 % (ref 40–53)
HDLC SERPL-MCNC: 47 MG/DL
HGB BLD-MCNC: 14 G/DL (ref 13.3–17.7)
INR PPP: 1.3 (ref 0.85–1.15)
LDLC SERPL CALC-MCNC: 58 MG/DL
MCH RBC QN AUTO: 30.8 PG (ref 26.5–33)
MCHC RBC AUTO-ENTMCNC: 32.8 G/DL (ref 31.5–36.5)
MCV RBC AUTO: 94 FL (ref 78–100)
NONHDLC SERPL-MCNC: 82 MG/DL
PLATELET # BLD AUTO: 147 10E3/UL (ref 150–450)
POTASSIUM SERPL-SCNC: 5.3 MMOL/L (ref 3.4–5.3)
PROT SERPL-MCNC: 8.7 G/DL (ref 6.4–8.3)
RBC # BLD AUTO: 4.54 10E6/UL (ref 4.4–5.9)
SODIUM SERPL-SCNC: 141 MMOL/L (ref 135–145)
TRIGL SERPL-MCNC: 118 MG/DL
WBC # BLD AUTO: 6.7 10E3/UL (ref 4–11)

## 2024-04-11 PROCEDURE — 82105 ALPHA-FETOPROTEIN SERUM: CPT | Performed by: INTERNAL MEDICINE

## 2024-04-11 PROCEDURE — 99213 OFFICE O/P EST LOW 20 MIN: CPT | Performed by: INTERNAL MEDICINE

## 2024-04-11 PROCEDURE — 80061 LIPID PANEL: CPT | Performed by: PATHOLOGY

## 2024-04-11 PROCEDURE — 99000 SPECIMEN HANDLING OFFICE-LAB: CPT | Performed by: PATHOLOGY

## 2024-04-11 PROCEDURE — 36415 COLL VENOUS BLD VENIPUNCTURE: CPT | Performed by: PATHOLOGY

## 2024-04-11 PROCEDURE — 80053 COMPREHEN METABOLIC PANEL: CPT | Performed by: PATHOLOGY

## 2024-04-11 PROCEDURE — 82248 BILIRUBIN DIRECT: CPT | Performed by: PATHOLOGY

## 2024-04-11 PROCEDURE — 85610 PROTHROMBIN TIME: CPT | Performed by: PATHOLOGY

## 2024-04-11 PROCEDURE — 99205 OFFICE O/P NEW HI 60 MIN: CPT | Mod: GC | Performed by: INTERNAL MEDICINE

## 2024-04-11 PROCEDURE — 85027 COMPLETE CBC AUTOMATED: CPT | Performed by: PATHOLOGY

## 2024-04-11 ASSESSMENT — PAIN SCALES - GENERAL: PAINLEVEL: NO PAIN (0)

## 2024-04-11 NOTE — LETTER
"    4/11/2024         RE: Bao Keenan  [id number] [facility] [address]        Dear Colleague,    Thank you for referring your patient, Bao Keenan, to the [facility]PAT[facility] [place]. Please see a copy of my visit note below.    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     # Hx of Portal/splenic vein thrombosis in the setting of Protein C deficiency  # Hx of esophageal variceal bleeding s/p banding and gastric varices s/p embolization (4/2014)  Longstanding history of non-cirrhotic portal hypertension related to portal/splenic vein thrombosis in the setting of Protein C deficiency, initially diagnosed 6/2009, complicated by remote variceal bleeding (2014), splenomegaly, thrombocytopenia, and coagulopathy (although also on anticoagulation). Mr. Keenan requires further evaluation to determine 1) whether or not his PVT is still presents, and 2) repeat EGD for variceal screening as well as EUS to measure his portal pressures to better assess his current portal hypertension status. To further evaluate for PVT, will obtain abdominal ultrasound with doppler. Ideally, we would obtain a contrasted CT study to evaluate for this, but given he makes significant urine, Transplant Nephrology prefers we do not use contrast. If present, given this clot is chronic there is likely not a role for thrombectomy and also likely unnecessary prior to a renal " transplant. For assessment of his portal pressures, we would like to evaluate via endoscopic ultrasound-guided measurement for the best accuracy over the transjugular approach.     # MASLD  Mr. Keenan has had longstanding MASLD and has multiple related metabolic conditions including obesity, prediabetes, HLD. Mr. Keenan has been working on weight loss through diet. Encouraged him to work with his primary care doctor regarding health weight management with lifestyle modifications and discussion of management of his prediabetes.  We discussed that if the patient were to undergo transplantation he is at risk of gaining weight in the setting of immunosuppression and prednisone use and that he would benefit from maintaining a healthy weight pretransplant    # H pylori infection  Found to have H pylori after 5/2022 hospitalization for bleeding presumed to be secondary to PHG. Reportedly treated with triple therapy, but does not appear there was a repeat test to confirm eradication.    Plan  - abdominal ultrasound with doppler for PVT assessment  - EGD for variceal screening along with EUS for portal pressure gradient measurement  - discuss weight management and prediabetes with primary care provider.  We discussed the importance of increasing his exercise/mobility.  He would benefit from reducing his portion sizes, reducing sugars, or carbohydrates and processed foods.   - H pylori stool antigen test to confirm eradication.     RTC pending above evaluation     This patient was seen and discussed with Dr. Tomlinson.    Tiffani Abrams MD  PGY-3  Internal Medicine     -------------------------  Patient with a history of portal and splenic vein thrombosis in the setting of protein C deficiency.  The patient has a history of esophageal variceal bleeding in 2014 + gastric varices.  The patient underwent partial embolization of the spleen plus shunts and embolization of a varix emptying into the left renal vein in 2014.  Since that time  he has had no further variceal bleeding.  He underwent an EGD in January 2023 with no evidence of varices.  The patient is currently on anticoagulation in the setting of protein C deficiency as well as history of VTE and portal vein clot.  Based on recent imaging it is unclear if the patient's portal vein clot is persistent.  Discussed the patient with Dr. Tucker (transplant nephrology) in clinic -given the patient continues to make a substantial amount of urine it would not be beneficial to do a CT with contrast to evaluate the portal vein thus we will opt for an ultrasound with Doppler.  In order to assess if the patient has clinically significant portal hypertension we will plan to do an EUS with portal pressure measurements as well as an EGD to evaluate for varices; I have sent a message to the specialist/advanced endoscopist Dr. Corrales to get his input on if this is feasible.     Rest per resident/fellow/RASHAUN note from today.    The patient was seen and examined with the resident/fellow physician or RASHAUN.  We have discussed the patient in detail and I agree with the findings, assessment, and plan as documented when this note was cosigned on this day.     Kim Tomlinson M.D.   of Medicine  [facility]  Advanced/Transplant Hepatology   ---------------------------    I spent 60 minutes on this encounter performing the following: reviewing the patient's medical record (clinic visits, hospital records, lab results, imaging and procedural documentation), history taking, physical exam and documentation on the date of the encounter. I also spent part of the time in coordination of care and counseling.    HPI:  Mr. Keenan presents to Hepatology clinic today to undergo pre-kidney transplant evaluation and optimization related to his liver disease. He has an extensive liver history with non-cirrhotic portal hypertension secondary to PVT in the setting of Protein C deficiency, and also has  evidence of MASLD.     Summary of Liver Disease  - 2005: DVT (right leg)   - 6/2009: Presented with abdominal pain and CT scan was noted for acute thrombus in the portal and splenic veins.  Hypercoagulable workup at that time demonstrated a low protein C.  Protein C and Antithrombin were normal.  Factor V Leiden and prothrombin mutation were normal/negative.  Lupus anticoagulant was negative on 2 occasions.  Cardiolipin antibody was negative.  - 4/15/2014: Admitted with hematochezia.  Upper endoscopy was notable for portal hypertension and bleeding varices that required banding.  CT scan at that time noted cavernous transformation of the portal vein with multiple collaterals and evidence of portal hypertension with splenomegaly and varices.  - 5/2014: Seen by a hematologist/oncologist and restarted on anticoagulation  - 2014: IVC filter placed (since removed 12/2014)  - 9/10/2014: partial embolization of the spleen + shunts and embolization of the varix emptying into the left renal vein with microcoils and Gelfoam  - 5/2022: admitted for hematemesis felt to be secondary to bleeding PHG. Also found to be H pylori and was reportedly treated with triple therapy. Cannot find records of stool antigen testing.   - 1/11/2023: admission for FELICIANO on CKD and during the hospitalization had hematochezia with EGD/colonoscopy: EGD unremarkable and colonoscopy with one non-bleeding AVM s/p ablation and oozing internal hemorrhoids in the setting of a supratheraputic INR    Today, the patient reports that he is overall doing well. He denies any problems with abdominal distension, lower extremity edema, lethargy/confusion, jaundice, melena, hematemesis, or hematochezia. He is unsure whether or not he still has the clot in his PVT.     Regarding HD, he dialyzes on MWF. His dry weight is 116 kg. He reportedly makes significant amount of urine. He is eager to be evaluated for his kidney transplant as his son is planning to donate his  kidney for him and has a busy schedule coming up this summer working in [employer] in [place]. Mr. Keenan has been working on weight loss. At his heaviest, he was 332 lbs. At his least, he was 220 lbs. Since about 2/2024, he has been in the 250s. He primarily loses weight through diet, does not follow an organized exercise regimen.       Medical hx Surgical hx   Past Medical History:   Diagnosis Date    Anemia     Antiplatelet or antithrombotic long-term use     Arthritis     Right Knee    Coagulation disorder (H24)     Protein C deficiency    Gastro-oesophageal reflux disease     History of blood transfusion     Hypertension     Liver disease     non alcoholic fatty liver    Portal hypertension (H)     Renal disease     CKD    Thrombosis of leg     +DVT      Past Surgical History:   Procedure Laterality Date    BIOPSY      2014    COLONOSCOPY      ENT SURGERY      tonsils    EYE SURGERY      lasik    IR IVC FILTER PLACEMENT      IR IVC FILTER REMOVAL  12/17/2014    TIPS PROCEDURE            Medications  Current Outpatient Medications   Medication Sig Dispense Refill    B Complex-C-Zn-Folic Acid (DIALYVITE/ZINC) TABS Take 1 tablet by mouth every morning      calcium acetate (PHOSLO) 667 MG CAPS capsule calcium acetate(phosphat bind) 667 mg      ELIQUIS ANTICOAGULANT 2.5 MG tablet Take 1 tablet by mouth      Ferrous Sulfate (IRON SUPPLEMENT PO) Take 325 mg by mouth 2 times daily (with meals)       rosuvastatin (CRESTOR) 5 MG tablet Take 5 mg by mouth         Allergies  No Known Allergies    Family hx Social hx   Family History   Problem Relation Age of Onset    Kidney Disease No family hx of       Social History     Tobacco Use    Smoking status: Never    Smokeless tobacco: Never   Substance Use Topics    Alcohol use: No    Drug use: No     - 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    Examination  /68 (BP Location: Right arm, Patient Position: Sitting, Cuff Size: Adult Large)   Pulse 85   Temp 98.1  F (36.7  C) (Oral)   Wt 115.2 kg (254 lb)   SpO2 96%   BMI 39.78 kg/m     Body mass index is 39.78 kg/m .    Gen-NAD  Eye- EOMI  ENT- MMM, normal oropharynx  CVS- regular rate, WWP  RS- breathing comfortably on RA  Abd- soft, non-tender, non-distended  Extr- no lower extremity edema bilaterally  Neuro- A+Ox3  Skin- no rash or jaundice  Psych- normal mood    Laboratory  CBC RESULTS:   Recent Labs   Lab Test 04/11/24  1154   WBC 6.7   RBC 4.54   HGB 14.0   HCT 42.7   MCV 94   MCH 30.8   MCHC 32.8   RDW 13.3   *     Last Comprehensive Metabolic Panel:  Lab Results   Component Value Date     04/11/2024    POTASSIUM 5.3 04/11/2024    CHLORIDE 99 04/11/2024    CO2 27 04/11/2024    ANIONGAP 15 04/11/2024     (H) 04/11/2024    BUN 37.8 (H) 04/11/2024    CR 4.90 (H) 04/11/2024    GFRESTIMATED 13 (L) 04/11/2024    RAQUEL 10.1 04/11/2024     Liver Function Studies -   Recent Labs   Lab Test 04/11/24  1154   PROTTOTAL 8.7*   ALBUMIN 4.7   BILITOTAL 0.8   ALKPHOS 69   AST 33   ALT 17         Radiology  CT Abdomen Pelvis w/o Contrast 2/2024  1.  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 Slight decrease in splenomegaly with multifocal areas of  infarcts again noted.  2.  Kidneys are slightly atrophic with bilateral cysts suspected.  3.  Stable presumed intraparenchymal lymph node along the right major  fissure.  4.  Bilateral fat-containing inguinal hernias with extension of the  anterior bladder wall towards the left hernia.    CT Abdomen Pelvis w/Contrast 10/2014  1. Splenorenal embolization changes are again seen. Multiple  associated splenic infarctions.  2. Slight decrease in splenomegaly. Multiple portal-systemic  collateral vessels are again seen.  3. Unchanged 0.8 cm  pleural-based nodule along the right major  fissure. Continued followup to document two-year stability is  recommended.  4. Unchanged mild herniation of the urinary bladder the left inguinal  canal.    CT Abdomen Pelvis w/Contrast 7/2014  1. Innumerable perisplenic, gastric, and gastrorenal varicose veins.  Multiple varicose veins in gastric fundus varices with scattered  distal esophageal varices.  2. 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  3. Splenomegaly.  4. Infrarenal IVC filter.  5. A 6 cm nodule in the right lower. Recommend followup with low dose  chest CT in 12 months as per Fleischner Society criteria.    Endoscopy  EGD 1/2023      EGD 5/27/2022  - Normal esophagus.        - Bleeding gastropathy, suspect portal HTN gastropathy.        - Normal examined duodenum.     EGD 12/31/2020  - Normal esophagus.        - healed , clips present        - Normal examined duodenum.     EGD 10/15/2020  - Non-bleeding diminutive esophageal varices.        - Oozing gastric ulcer with oozing hemorrhage (Sundeep Class Ib).        Injected. Clips were placed.        - Duodenitis.        - No specimens collected.       Kim Tomlinson MD

## 2024-04-11 NOTE — TELEPHONE ENCOUNTER
Pt called to let me know he saw Hepatology today.  He is frustrated that he needs to undergo further testing, but also expresses understanding. I offered reassurance that he is nearing the end of his pre-kidney transplant work up and commended him on his progress thus far.  He had questions about his donor's status, I explained that I am unable to share any information on his donor, but encouraged him to have his donor contact his coordinator if he is unclear on next steps. He had no further questions at this time. We will connect after he completes the testing ordered by Hepatology.

## 2024-04-11 NOTE — PATIENT INSTRUCTIONS
- Abdominal ultrasound with doppler  - Stool study to evaluate for clearance of H Pylori  - Discuss with advanced endoscopy colleagues about upper endoscopy +/- endoscopic ultrasound with portal pressure measurements   - Work with your primary care doctor regarding healthy weight management - lifestyle and dietary changes. Work on portion control, reducing sugar, carbohydrate and processed foods. Work on increasing your activity, including walking to get your heart rate up.   - Talk to your primary care doctor about pre-diabetes

## 2024-04-11 NOTE — NURSING NOTE
Chief Complaint   Patient presents with    Consult     NEW LIVER     /68 (BP Location: Right arm, Patient Position: Sitting, Cuff Size: Adult Large)   Pulse 85   Temp 98.1  F (36.7  C) (Oral)   Wt 115.2 kg (254 lb)   SpO2 96%   BMI 39.78 kg/m      Amarjit Salazar CMA on 4/11/2024 at 12:31 PM

## 2024-04-15 ENCOUNTER — TELEPHONE (OUTPATIENT)
Dept: TRANSPLANT | Facility: CLINIC | Age: 64
End: 2024-04-15
Payer: COMMERCIAL

## 2024-04-15 ENCOUNTER — LAB (OUTPATIENT)
Dept: LAB | Facility: CLINIC | Age: 64
End: 2024-04-15
Payer: COMMERCIAL

## 2024-04-15 ENCOUNTER — TELEPHONE (OUTPATIENT)
Dept: GASTROENTEROLOGY | Facility: CLINIC | Age: 64
End: 2024-04-15
Payer: COMMERCIAL

## 2024-04-15 ENCOUNTER — HOSPITAL ENCOUNTER (OUTPATIENT)
Facility: CLINIC | Age: 64
End: 2024-04-15
Attending: INTERNAL MEDICINE | Admitting: INTERNAL MEDICINE
Payer: COMMERCIAL

## 2024-04-15 DIAGNOSIS — Z76.82 AWAITING ORGAN TRANSPLANT: ICD-10-CM

## 2024-04-15 PROCEDURE — 86833 HLA CLASS II HIGH DEFIN QUAL: CPT

## 2024-04-15 PROCEDURE — 86832 HLA CLASS I HIGH DEFIN QUAL: CPT

## 2024-04-15 NOTE — TELEPHONE ENCOUNTER
Pre assessment completed for upcoming procedure.   (Please see previous telephone encounter notes for complete details)       Procedure details:    Arrival time and facility location reviewed.    Pre op exam needed? N/A    Designated  policy reviewed. Instructed to have someone stay 24  hours post procedure.       Medication review:    Medications reviewed. Please see supporting documentation below. Holding recommendations discussed (if applicable).       Prep for procedure:     Procedure prep instructions reviewed.        Any additional information needed:  N/A      Patient  verbalized understanding and had no questions or concerns at this time.      Carol Ann Velasquez RN  Endoscopy Procedure Pre Assessment RN  [phone] option 4

## 2024-04-15 NOTE — TELEPHONE ENCOUNTER
Pre visit planning completed.      Procedure details:    Patient scheduled for Upper endoscopy (EGD) on 4/25/2024.     Arrival time: 0630. Procedure time 0800    Facility location: [facility]; [address] [place] [address], [address] Floor, [place] [address]. Check in location: Main entrance at registration desk.    Sedation type: MAC    Pre op exam needed? Yes. PAC scheduled on 4/18    Indication for procedure: Esophageal varices without bleeding, unspecified esophageal varices type (H) [I85.00]       Chart review:     Electronic implanted devices? No    Recent diagnosis of diverticulitis within the last 6 weeks? N/A    Diabetic? No      Medication review:    Anticoagulants? Yes Apixaban (Eliquis): Recommended HOLD 2 days before procedure.  Consult with your managing provider.    NSAIDS? No    Other medication HOLDING recommendations:  N/A      Prep for procedure:     Bowel prep recommendation: N/A    Prep instructions sent via [employer]         Carol Ann Velasquez RN  Endoscopy Procedure Pre Assessment RN  [phone] option 4

## 2024-04-15 NOTE — TELEPHONE ENCOUNTER
"Endoscopy Scheduling Screen    Have you had a positive Covid test in the last 14 days?  No    What is your communication preference for Instructions and/or Bowel Prep?   MyChart    What insurance is in the chart?  Other:  BCBS    Ordering/Referring Provider: NANCY RIDLEY    (If ordering provider performs procedure, schedule with ordering provider unless otherwise instructed. )    BMI: Estimated body mass index is 39.78 kg/m  as calculated from the following:    Height as of 3/14/24: 1.702 m (5' 7\").    Weight as of 4/11/24: 115.2 kg (254 lb).     Sedation Ordered  moderate sedation.   If patient BMI > 50 do not schedule in ASC.    If patient BMI > 45 do not schedule at [facility].    Are you taking methadone or Suboxone?  No    Have you had difficulties, pain, or discomfort during past endoscopy procedures?  No    Are you taking any prescription medications for pain 3 or more times per week?   NO, No RN review required.    Do you have a history of malignant hyperthermia?  No    (Females) Are you currently pregnant?   No     Have you been diagnosed or told you have pulmonary hypertension?   No    Do you have an LVAD?  No    Have you been told you have moderate to severe sleep apnea?  Yes (RN Review required for scheduling unless scheduling in Hospital.)    Have you been told you have COPD, asthma, or any other lung disease?  No    Do you have any heart conditions?  No     Have you ever had or are you waiting for an organ transplant?  No. Continue scheduling, no site restrictions. - WAITING FOR KIDNEY    Have you had a stroke or transient ischemic attack (TIA aka \"mini stroke\" in the last 6 months?   No    Have you been diagnosed with or been told you have cirrhosis of the liver?   No    Are you currently on dialysis?   Yes (Hospital Only)    Do you need assistance transferring?   No    BMI: Estimated body mass index is 39.78 kg/m  as calculated from the following:    Height as of 3/14/24: 1.702 m (5' 7\").   "  Weight as of 4/11/24: 115.2 kg (254 lb).     Is patients BMI > 40 and scheduling location [facility]?  No    Do you take an injectable medication for weight loss or diabetes (excluding insulin)?  No    Do you take the medication Naltrexone?  No    Do you take blood thinners?  Yes     Are you taking Effient/Prasugrel?  No, you must contact your prescribing provider for direction on holding or bridging with a different medication.       Prep   Are you currently on dialysis or do you have chronic kidney disease?  Yes (Golytely Prep)    Do you have a diagnosis of diabetes?  No - PRE-DIABETIC    Do you have a diagnosis of cystic fibrosis (CF)?  No    On a regular basis do you go 3 -5 days between bowel movements?  No    BMI > 40?  Yes (Extended Prep)    Preferred Pharmacy:    [id number] - [place], [place] - [address] [facility]  [address] [facility] [address]  Phone: [phone] Fax: [phone]      Final Scheduling Details     Procedure scheduled  Upper endoscopy (EGD)    Surgeon:  VERENICE     Date of procedure:  04/25/2024    Pre-OP / PAC:   Yes - PAC clinic evaluation scheduled.  Virtual visit - 04/18/2024  Location  [facility]  PT ON DIALYSIS/PT MENTIONED HAVING GREG     Sedation   MAC/Deep Sedation - Patient preference.      Patient Reminders:   You will receive a call from a Nurse to review instructions and health history.  This assessment must be completed prior to your procedure.  Failure to complete the Nurse assessment may result in the procedure being cancelled.      On the day of your procedure, please designate an adult(s) who can drive you home stay with you for the next 24 hours. The medicines used in the exam will make you sleepy. You will not be able to drive.      You cannot take public transportation, ride share services, or non-medical taxi service without a responsible caregiver.  Medical transport services are allowed with the requirement that a responsible caregiver will receive you at your  destination.  We require that drivers and caregivers are confirmed prior to your procedure.

## 2024-04-15 NOTE — TELEPHONE ENCOUNTER
Pt called to let me know his EGD is scheduled for 4/25/24.  He had questions about how to provide h. Pylori sample, I will reach out to Dr. Tomlinson's team for assistance. He had no further questions.

## 2024-04-15 NOTE — TELEPHONE ENCOUNTER
FUTURE VISIT INFORMATION      SURGERY INFORMATION:  Date: 24  [facility]  Surgeon:  Guillaume Wolfe MD   Anesthesia Type:  MAC  Procedure: Esophagoscopy, gastroscopy, duodenoscopy (EGD), combined     RECORDS REQUESTED FROM:       Primary Care Provider: Stephani Eric MD  - [facility]    Pertinent Medical History: portal hypertension    Most recent EKG+ Tracin24    Most recent ECHO:  24    Most recent Cardiac Stress Test: 3/5/24

## 2024-04-16 ENCOUNTER — ANCILLARY PROCEDURE (OUTPATIENT)
Dept: ULTRASOUND IMAGING | Facility: CLINIC | Age: 64
End: 2024-04-16
Attending: INTERNAL MEDICINE
Payer: COMMERCIAL

## 2024-04-16 ENCOUNTER — TELEPHONE (OUTPATIENT)
Dept: GASTROENTEROLOGY | Facility: CLINIC | Age: 64
End: 2024-04-16

## 2024-04-16 DIAGNOSIS — I81 PORTAL VEIN THROMBOSIS: ICD-10-CM

## 2024-04-16 DIAGNOSIS — A04.8 H. PYLORI INFECTION: ICD-10-CM

## 2024-04-16 DIAGNOSIS — Z01.818 PRE-TRANSPLANT EVALUATION FOR KIDNEY TRANSPLANT: ICD-10-CM

## 2024-04-16 PROCEDURE — 93975 VASCULAR STUDY: CPT

## 2024-04-16 NOTE — TELEPHONE ENCOUNTER
Caller:     Reason for Reschedule/Cancellation   (please be detailed, any staff messages or encounters to note?): Per inbasket message patient is going to be scheduled with Dr. Corrales for EGD/EUS. Per inbasket message Ayesha is taking care of rescheduling.       Prior to reschedule please review:  Ordering Provider:     Kim Tomlinson MD in  MEDICAL SPECIALTIES     Sedation Determined: mac  Does patient have any ASC Exclusions, please identify?: Y GREG      Notes on Cancelled Procedure:  Procedure: Upper Endoscopy [EGD]   Date: 04/25/2024  Location: [facility]; [address] [place] [address], [address] Floor, [place] [address]   Surgeon: Enoch      Rescheduled: No,         Did you cancel or rescheduled an EUS procedure? No.

## 2024-04-17 ENCOUNTER — PREP FOR PROCEDURE (OUTPATIENT)
Dept: GASTROENTEROLOGY | Facility: CLINIC | Age: 64
End: 2024-04-17
Payer: COMMERCIAL

## 2024-04-17 ENCOUNTER — TELEPHONE (OUTPATIENT)
Dept: TRANSPLANT | Facility: CLINIC | Age: 64
End: 2024-04-17
Payer: COMMERCIAL

## 2024-04-17 ENCOUNTER — PATIENT OUTREACH (OUTPATIENT)
Dept: GASTROENTEROLOGY | Facility: CLINIC | Age: 64
End: 2024-04-17
Payer: COMMERCIAL

## 2024-04-17 DIAGNOSIS — I86.4 GASTRIC VARICES: ICD-10-CM

## 2024-04-17 DIAGNOSIS — K76.6 PORTAL HYPERTENSION (H): ICD-10-CM

## 2024-04-17 DIAGNOSIS — I85.00 ESOPHAGEAL VARICES (H): Primary | ICD-10-CM

## 2024-04-17 DIAGNOSIS — Z76.82 AWAITING ORGAN TRANSPLANT: Primary | ICD-10-CM

## 2024-04-17 NOTE — TELEPHONE ENCOUNTER
Returned call to pt, he is frustrated that there has been a change in the plans with his GI testing. He is also frustrated that his next procedure falls on a dialysis day. I have offered to reach out to the GI team to help him get clarification on his questions. I have also offered to send him the phone number for patient relations and let him know he can voice his concerns via that avenue as well. He was appreciative and had no further questions.

## 2024-04-17 NOTE — PROGRESS NOTES
Please assist in scheduling:     Procedure/Imaging/Clinic: EGD with variceal screening and EUS with portal pressure gradient measurement  Physician: Savanna  Timing: Next available  Scope time: Provider average  Anesthesia: General  Dx: Esophageal varices/gastric varices/portal hypertension  Tier:3  Location: [facility]  Patient communication letter header:EGD (esophagogastroduodenoscopy) and EUS (endoscopic ultrasound) with portal pressure measurements.

## 2024-04-17 NOTE — PROGRESS NOTES
Referral from Dr. Tomlinson to Dr. Corrales for EGD with variceal screening and EUS with PPG measurement in the OR under [facility].    Please assist in scheduling:     Procedure/Imaging/Clinic: EGD with variceal screening and EUS with portal pressure gradient measurement  Physician: Savanna  Timing: Next available  Scope time: Provider average  Anesthesia: General  Dx: Esophageal varices/gastric varices/portal hypertension  Tier:3  Location: [facility]  Patient communication letter header:EGD (esophagogastroduodenoscopy) and EUS (endoscopic ultrasound) with portal pressure measurements.     Called to discuss with patient. Offered 5/13/24; patient is agreeable, though frustrated with lack of earlier appointment. Discussed that I will keep him on a cancellation schedule should something come available sooner.     Patient will need a , someone to stay with them for 24 hours and should stay in town for 24 hours (within 45 min of Hospital) post procedure. Wife to transport.     Patient will need a pre-op physical within 30 days of procedure. If outside [facility] system, will need physical faxed to number [phone]   If you do not get a preop physical, your procedure could be cancelled, patient voiced understanding*    Preop Plan: PAC clinic 4/18/24    Does patient have any history of gastric bypass/gastric surgery/altered panc/bili anatomy? No    Does patient have [employer] insurance? No    Med Review    Blood thinner -  Eliquis; 2 day hold to begin 5/11/24  ASA - None  Diabetic - None   Injectable or oral medications for weight loss - None    Patient Education r/t procedure: Discussion/MyChart    A pre-op nurse will call 1-2 days prior to the procedure.    NPO/Prep: No solid food 8 hours prior to arrival at the hospital. Clear liquids okay until 2 hours prior to arrival.     Other specific details/comments: Patient has dialysis MWF AM. Available to be at [facility] by 11AM.     Advised to contact clinic in the event of Covid  e-Consult (IPC)  - Interventional Radiology  Belgica Silverman 82 y o  female MRN: [id number]  Unit/Bed#: MICU 10 Encounter: [id number]          Interventional Radiology has been consulted to evaluate Belgica Silverman    We were consulted by critical care concerning this patient with saddle PE, anticoagulation failure  IP Consult to IR  Consult performed by: BROOKE De Los Santos  Consult ordered by: Shawn Roper,         09/23/22    Assessment/Recommendation:     82year old female known to IR s/p mechanical PE thrombectomy on 9/22  Unfortunately, patient was receiving Xaretlo 20 mg PO since her diagnosis of PE in August 2022 and this is considered a failure of Xarelto  Given her stage IV metastatic breast CA and b/l LE DVT is it not unreasonable to place a filter at this time  Plan for IVC filter placement today/tomorrow per IR schedule  - patient does not need to be NPO      21-30 minutes, >50% of the total time devoted to medical consultative verbal/EMR discussion between providers  Written report will be generated in the EMR  Thank you for allowing Interventional Radiology to participate in the care of Kerry Silverman  Please don't hesitate to call or TigerText us with any questions       [address] [place] [address] Click symptoms or known exposure within 14 days of procedure: Pre-op information.     Verbalized understanding of all instructions. All questions answered. Clinic contact and scheduling numbers verified for future questions/concerns. Message routed to OR scheduling.     Ayesha Pete RN, BSN  Care Coordinator  [facility]

## 2024-04-17 NOTE — TELEPHONE ENCOUNTER
Pt had one  task to do before he is done with his evaluation.  Received a message from Dr Tomlinson ad theynn want  to do a different test and now it will be 2-3 weeks  frustrated  that things keep moving further away  He has a potential donor that is waiting to donate to him

## 2024-04-18 ENCOUNTER — ANESTHESIA EVENT (OUTPATIENT)
Dept: SURGERY | Facility: CLINIC | Age: 64
End: 2024-04-18
Payer: COMMERCIAL

## 2024-04-18 ENCOUNTER — PRE VISIT (OUTPATIENT)
Dept: SURGERY | Facility: CLINIC | Age: 64
End: 2024-04-18

## 2024-04-18 ENCOUNTER — APPOINTMENT (OUTPATIENT)
Dept: LAB | Facility: OTHER | Age: 64
End: 2024-04-18
Payer: COMMERCIAL

## 2024-04-18 ENCOUNTER — VIRTUAL VISIT (OUTPATIENT)
Dept: SURGERY | Facility: CLINIC | Age: 64
End: 2024-04-18
Payer: COMMERCIAL

## 2024-04-18 ENCOUNTER — PATIENT OUTREACH (OUTPATIENT)
Dept: GASTROENTEROLOGY | Facility: CLINIC | Age: 64
End: 2024-04-18

## 2024-04-18 ENCOUNTER — TELEPHONE (OUTPATIENT)
Dept: MULTI SPECIALTY CLINIC | Facility: CLINIC | Age: 64
End: 2024-04-18

## 2024-04-18 VITALS — BODY MASS INDEX: 39.87 KG/M2 | HEIGHT: 67 IN | WEIGHT: 254 LBS

## 2024-04-18 DIAGNOSIS — I85.00 ESOPHAGEAL VARICES WITHOUT BLEEDING, UNSPECIFIED ESOPHAGEAL VARICES TYPE (H): ICD-10-CM

## 2024-04-18 DIAGNOSIS — K76.6 PORTAL HYPERTENSION (H): ICD-10-CM

## 2024-04-18 DIAGNOSIS — Z01.818 PREOP EXAMINATION: Primary | ICD-10-CM

## 2024-04-18 PROCEDURE — 87338 HPYLORI STOOL AG IA: CPT

## 2024-04-18 PROCEDURE — 99204 OFFICE O/P NEW MOD 45 MIN: CPT | Mod: 95 | Performed by: CLINICAL NURSE SPECIALIST

## 2024-04-18 RX ORDER — LIDOCAINE 40 MG/G
CREAM TOPICAL
Status: CANCELLED | OUTPATIENT
Start: 2024-04-18

## 2024-04-18 ASSESSMENT — LIFESTYLE VARIABLES: TOBACCO_USE: 0

## 2024-04-18 ASSESSMENT — ENCOUNTER SYMPTOMS
SEIZURES: 0
DYSRHYTHMIAS: 0

## 2024-04-18 ASSESSMENT — PAIN SCALES - GENERAL: PAINLEVEL: NO PAIN (0)

## 2024-04-18 NOTE — PATIENT INSTRUCTIONS
Preparing for Your Surgery      Name:  Bao Keenan   MRN:  [id number]   :  1960   Today's Date:  2024       Arriving for surgery:  Surgery date:  24  Arrival time:  5:30 am  Surgery time: 7:30 am    Please come to:     Please come to:       [facility] [place] [facility]    [address] [place]  [address]     The [facility] [place] Patient/Visitor Ramp is at [address]. Patients and visitors who self-park will receive the reduced hospital parking rate. If the Patient /Visitor Ramp is full, please follow the signs to the [employer] car park located at the main hospital entrance.       parking is available (24 hours/ 7 days a week)      Discounted parking pass options are available for patients and visitors. They can be purchased at the [employer] desk at the main hospital entrance.     -    Stop at the security desk and they will direct surgery patients to the Surgery Check in and Family [facility]. [phone]        - If you need directions, a wheelchair or an escort please stop at the Information/security desk in the lobby.     What can I eat or drink?  -  You may eat and drink normally up to 8 hours prior to arrival time. (Until 9:30 pm on 24)  -  You may have clear liquids until 2 hours prior to arrival time. (Until 3:30 am on 24)    Examples of clear liquids:  Water  Clear broth  Juices (apple, white grape, white cranberry  and cider) without pulp  Noncarbonated, powder based beverages  (lemonade and Lorenzo-Aid)  Sodas (Sprite, 7-Up, ginger ale and seltzer)  Coffee or tea (without milk or cream)  Gatorade    -  No Alcohol or cannabis products for at least 24 hours before surgery.     Which medicines can I take?    Hold Aspirin for 7 days before surgery.   Hold Multivitamins for 7 days before surgery.  Hold Supplements for 7 days before surgery.  Hold Ibuprofen (Advil, Motrin)  for 1 day(s) before surgery--unless otherwise directed by surgeon.  Hold Naproxen (Aleve) for 4 days before surgery.    -  DO NOT take these medications the day of surgery:  Super C complex  Daily Michael/Vitamin C  Phoslo  Eliquis - stop 2 days before surgery - last dose to be 5/10/24 evening  Ferrous sulfate (Iron)    -  PLEASE TAKE these medications the day of surgery:  Rosuvastatin (Crestor)    How do I prepare myself?  - Please take 2 showers (one the night prior to surgery and one the morning of surgery) using Scrubcare or Hibiclens soap.    Use this soap only from the neck to your toes.     Leave the soap on your skin for one minute--then rinse thoroughly.      You may use your own shampoo and conditioner. No other hair products.   - Please remove all jewelry and body piercings.  - No lotions, deodorants or fragrance.  - No makeup or fingernail polish.   - Bring your ID and insurance card.    -If you use a CPAP machine, please bring the CPAP machine, tubing, and mask to hospital.    -If you have a Deep Brain Stimulator, Spinal Cord Stimulator, or any Neuro Stimulator device---you must bring the remote control to the hospital.      ALL PATIENTS GOING HOME THE SAME DAY OF SURGERY ARE REQUIRED TO HAVE A RESPONSIBLE ADULT TO DRIVE AND BE IN ATTENDANCE WITH THEM FOR 24 HOURS FOLLOWING SURGERY.    Covid testing policy as of 12/06/2022  Your surgeon will notify and schedule you for a COVID test if one is needed before surgery--please direct any questions or COVID symptoms to your surgeon      Questions or Concerns:    - For any questions regarding the day of surgery or your hospital stay, please contact the Pre Admission Nursing Office at [phone].       - If you have health changes between today and your surgery, please call your surgeon.       - For questions after surgery, please call your surgeons office.           Current Visitor Guidelines    You may have 2 visitors in the pre op area.    Visiting hours: 8  a.m. to 8:30 p.m.    Patients confirmed or suspected to have symptoms of COVID 19 or flu:     No visitors allowed for adult patients.   Children (under age 18) can have 1 named visitor.     People who are sick or showing symptoms of COVID 19 or flu:    Are not allowed to visit patients--we can only make exceptions in special situations.       Please follow these guidelines for your visit:          Please maintain social distance          Masking is optional--however at times you may be asked to wear a mask for the safety of yourself and others     Clean your hands with alcohol hand . Do this when you arrive at and leave the building and patient room,    And again after you touch your mask or anything in the room.     Go directly to and from the room you are visiting.     Stay in the patient s room during your visit. Limit going to other places in the hospital as much as possible     Leave bags and jackets at home or in the car.     For everyone s health, please don t come and go during your visit. That includes for smoking   during your visit.

## 2024-04-18 NOTE — H&P
Pre-Operative H & P     CC:  Preoperative exam to assess for increased cardiopulmonary risk while undergoing surgery and anesthesia.    Date of Encounter: 4/18/2024  Primary Care Physician:  Stephani Eric     Reason for visit:   Encounter Diagnoses   Name Primary?    Preop examination Yes    Esophageal varices without bleeding, unspecified esophageal varices type (H)     Portal hypertension (H)        HPI  Bao Keenan is a 63 year old male who presents for pre-operative H & P in preparation for  Procedure Information       Case: [id number] Date/Time: 05/13/24 0730    Procedures:       ESOPHAGOGASTRODUODENOSCOPY (Esophagus)      ENDOSCOPIC ULTRASOUND, ESOPHAGOSCOPY / UPPER GASTROINTESTINAL TRACT (GI) (Esophagus)    Anesthesia type: General    Diagnosis:       Esophageal varices (H) [I85.00]      Gastric varices [I86.4]      Portal hypertension (H) [K76.6]    Pre-op diagnosis:       Esophageal varices (H) [I85.00]      Gastric varices [I86.4]      Portal hypertension (H) [K76.6]    Location:  OR [address] [place] [address] OR    Providers: Guru Meseret Corrales MD          History is obtained from the patient and chart review    Patient with non-cirrhotic portal HYPERTENSION 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 This has been complicated by remote variceal bleeding (2014), splenomegaly, thrombocytopenia, and coagulopathy (although also on anticoagulation). He has been evaluated by GI and has been referred for above procedure to reevaluate for PVT, and repeat EGD for variceal screening as well as EUS to measure his portal pressures to better assess his current portal hypertension status.     His history also includes IgA nephropathy and end-stage renal disease on dialysis M,W,F, HYPERTENSION, GREG, DVT, prediabetes, and obesity. He is followed by Nephrology and undergoing kidney transplant evaluation.     Hx of abnormal bleeding or anti-platelet  use: Anticoagulated on Eliquis    Past Medical History  Past Medical History:   Diagnosis Date    Anemia     Antiplatelet or antithrombotic long-term use     Arthritis     Right Knee    Coagulation disorder (H24)     Protein C deficiency    Esophageal varices (H)     Gastro-oesophageal reflux disease     History of blood transfusion     Hypertension     IgA nephropathy     Liver disease     non alcoholic fatty liver    GREG (obstructive sleep apnea)     Portal hypertension (H)     Renal disease     CKD    Thrombosis of leg     +DVT       Past Surgical History  Past Surgical History:   Procedure Laterality Date    BIOPSY      2014    COLONOSCOPY      ENT SURGERY      tonsils    EYE SURGERY      lasik    IR IVC FILTER PLACEMENT      IR IVC FILTER REMOVAL  12/17/2014    TIPS PROCEDURE         Prior to Admission Medications  Current Outpatient Medications   Medication Sig Dispense Refill    Ascorbic Acid (SUPER C COMPLEX PO) Take 1 tablet by mouth every morning With, Vit D & Zinc      B Complex-C-Zn-Folic Acid (DIALYVITE/ZINC) TABS Take 1 tablet by mouth every morning      calcium acetate (PHOSLO) 667 MG CAPS capsule Take 667 mg by mouth 3 times daily (with meals)      ELIQUIS ANTICOAGULANT 2.5 MG tablet Take 1 tablet by mouth 2 times daily      Ferrous Sulfate (IRON SUPPLEMENT PO) Take 325 mg by mouth daily (with breakfast)      rosuvastatin (CRESTOR) 5 MG tablet Take 5 mg by mouth every morning         Allergies  No Known Allergies    Social History  Social History     Socioeconomic History    Marital status:      Spouse name: Not on file    Number of children: Not on file    Years of education: Not on file    Highest education level: Not on file   Occupational History    Not on file   Tobacco Use    Smoking status: Never    Smokeless tobacco: Never   Substance and Sexual Activity    Alcohol use: No    Drug use: No    Sexual activity: Not on file   Other Topics Concern    Parent/sibling w/ CABG, MI or  angioplasty before 65F 55M? Not Asked   Social History Narrative    Not on file     Social Determinants of Health     Financial Resource Strain: High Risk (1/1/2022)    [facility]    Financial Resource Strain     Difficulty of Paying Living Expenses: Not on file     Difficulty of Paying Living Expenses: Not on file   Food Insecurity: Not on file   Transportation Needs: Not on file   Physical Activity: Not on file   Stress: Not on file   Social Connections: Unknown (4/10/2023)    [facility]    Social Connections     Frequency of Communication with Friends and Family: Not on file   Interpersonal Safety: Not on file   Housing Stability: Not on file       Family History  Family History   Problem Relation Age of Onset    Protein C deficiency Niece     Kidney Disease No family hx of     Anesthesia Reaction No family hx of     Bleeding Disorder No family hx of        Review of Systems  The complete review of systems is negative other than noted in the HPI or here.   Anesthesia Evaluation   Pt has had prior anesthetic. Type: General and MAC.    No history of anesthetic complications       ROS/MED HX  ENT/Pulmonary:     (+) sleep apnea, uses CPAP,                                   (-) tobacco use, GREG risk factors and recent URI   Neurologic:    (-) no seizures and no CVA   Cardiovascular:     (+) Dyslipidemia hypertension-range: 115/60/ -   -  - -   Taking blood thinners Pt has not received instructions: Instructions Given to patient: Instructions from GI-2 day hold of Eliquis, starting 5/11/24.                            Previous cardiac testing   Echo: Date: 2/8/24 Results:    Stress Test:  Date: 3/5/24 Results:    ECG Reviewed:  Date: 2/8/24 Results:    Cath:  Date: Results:   (-) MOELLER and arrhythmias   METS/Exercise Tolerance: >4 METS     Hematologic: Comments: Protein C deficiency    (+) History of blood clots,    pt is anticoagulated, anemia, history of blood transfusion, no previous transfusion reaction,        Musculoskeletal:   (+)  arthritis,             GI/Hepatic:     (+) GERD, Asymptomatic on medication,           liver disease,       Renal/Genitourinary: Comment: IgA nephropathy    (+) renal disease, type: CRI, Pt does not require dialysis,           Endo:     (+)               Obesity,       Psychiatric/Substance Use:    (-) psychiatric history   Infectious Disease:  - neg infectious disease ROS     Malignancy:  - neg malignancy ROS     Other:  - neg other ROS          Virtual visit -  No vitals were obtained    Physical Exam  Constitutional: Awake, alert, no apparent distress, and appears stated age.  HENT: Normocephalic  Respiratory: non labored breathing; no cough   Neurologic: Oriented to name, place and time.   Neuropsychiatric: Calm, cooperative. Normal affect.      Prior Labs/Diagnostic Studies   All labs and imaging personally reviewed   Lab Results   Component Value Date    WBC 6.7 04/11/2024    WBC 6.0 10/07/2014     Lab Results   Component Value Date    RBC 4.54 04/11/2024    RBC 4.14 10/07/2014     Lab Results   Component Value Date    HGB 14.0 04/11/2024    HGB 11.8 10/07/2014     Lab Results   Component Value Date    HCT 42.7 04/11/2024    HCT 36.2 10/07/2014     Lab Results   Component Value Date    MCV 94 04/11/2024    MCV 87 10/07/2014     Lab Results   Component Value Date    MCH 30.8 04/11/2024    MCH 28.5 10/07/2014     Lab Results   Component Value Date    MCHC 32.8 04/11/2024    MCHC 32.6 10/07/2014     Lab Results   Component Value Date    RDW 13.3 04/11/2024    RDW 16.7 10/07/2014     Lab Results   Component Value Date     04/11/2024     10/07/2014   Last Comprehensive Metabolic Panel:  Sodium   Date Value Ref Range Status   04/11/2024 141 135 - 145 mmol/L Final     Comment:     Reference intervals  for this test were updated on 09/26/2023 to more accurately reflect our healthy population. There may be differences in the flagging of prior results with similar values performed with this method. Interpretation of those prior results can be made in the context of the updated reference intervals.    09/11/2014 140 133 - 144 mmol/L Final     Potassium   Date Value Ref Range Status   04/11/2024 5.3 3.4 - 5.3 mmol/L Final   09/11/2014 4.2 3.4 - 5.3 mmol/L Final     Chloride   Date Value Ref Range Status   04/11/2024 99 98 - 107 mmol/L Final   09/11/2014 108 94 - 109 mmol/L Final     Carbon Dioxide   Date Value Ref Range Status   09/11/2014 23 20 - 32 mmol/L Final     Carbon Dioxide (CO2)   Date Value Ref Range Status   04/11/2024 27 22 - 29 mmol/L Final     Anion Gap   Date Value Ref Range Status   04/11/2024 15 7 - 15 mmol/L Final   09/11/2014 9 6 - 17 mmol/L Final     Glucose   Date Value Ref Range Status   04/11/2024 103 (H) 70 - 99 mg/dL Final   09/11/2014 147 (H) 70 - 99 mg/dL Final     Comment:     Effective 7/30/2014, the reference range for this assay has changed to reflect   new instrumentation/methodology.       Urea Nitrogen   Date Value Ref Range Status   04/11/2024 37.8 (H) 8.0 - 23.0 mg/dL Final   09/11/2014 18 7 - 30 mg/dL Final     Comment:     Effective 7/30/2014, the reference range for this assay has changed to reflect   new instrumentation/methodology.       Creatinine   Date Value Ref Range Status   04/11/2024 4.90 (H) 0.67 - 1.17 mg/dL Final   09/11/2014 1.34 (H) 0.66 - 1.25 mg/dL Final     GFR Estimate   Date Value Ref Range Status   04/11/2024 13 (L) >60 mL/min/1.73m2 Final   09/11/2014 56 (L) >60 mL/min/1.7m2 Final     Comment:     Non  GFR Calc     Calcium   Date Value Ref Range Status   04/11/2024 10.1 8.8 - 10.2 mg/dL Final   09/11/2014 9.1 8.5 - 10.1 mg/dL Final     Comment:     Effective 7/30/2014, the reference range for this assay has changed to reflect   new  instrumentation/methodology.       Bilirubin Total   Date Value Ref Range Status   2024 0.8 <=1.2 mg/dL Final   2014 0.5 0.2 - 1.3 mg/dL Final     Alkaline Phosphatase   Date Value Ref Range Status   2024 69 40 - 150 U/L Final     Comment:     Reference intervals for this test were updated on 2023 to more accurately reflect our healthy population. There may be differences in the flagging of prior results with similar values performed with this method. Interpretation of those prior results can be made in the context of the updated reference intervals.   2014 43 40 - 150 U/L Final     ALT   Date Value Ref Range Status   2024 17 0 - 70 U/L Final     Comment:     Reference intervals for this test were updated on 2023 to more accurately reflect our healthy population. There may be differences in the flagging of prior results with similar values performed with this method. Interpretation of those prior results can be made in the context of the updated reference intervals.     2014 35 0 - 70 U/L Final     AST   Date Value Ref Range Status   2024 33 0 - 45 U/L Final     Comment:     Reference intervals for this test were updated on 2023 to more accurately reflect our healthy population. There may be differences in the flagging of prior results with similar values performed with this method. Interpretation of those prior results can be made in the context of the updated reference intervals.   2014 47 (H) 0 - 45 U/L Final     INR 1.30    24 A1c 6.0      EK24 (prelim)  Sinus rhythm with 1st degree A-V block   Incomplete right bundle branch block     Echocardiogram 24   Interpretation Summary  Technically difficult study.     Global and regional left ventricular function is normal with an EF of 55-60%.  The right ventricle is normal in size and function.  No significant valvular abnormalities present.  No pericardial effusion is present.  IVC  diameter <2.1 cm collapsing >50% with sniff suggests a normal RA pressure  of 3 mmHg.  This study was compared with the study from 9/22/2014 with no significant  changes noted.    3/5/24 Echocardiogram exercise stress   Interpretation Summary  Near maximal stress test, achieved 84% of the age predicted maximal heart  rate. Test stopped due to fatigue.     Normal blood pressure response to exercise.  No angina symptoms with exercise.  No ECG evidence of ischemia.  Normal global LV function with EF of approximately 55-60% at rest.  With exercise, LVEF increases to 60-65% and left ventricular cavity size  decreases appropriately.  No rest or stress-induced regional wall motion abnormalities.  Average functional capacity for age.     No significant valvular dysfunction noted on screening 2D and Doppler  examination.    4/6/24 US abd                                         IMPRESSION:      1. No evidence for current splenic or portal vein thrombosis. Narrowed  appearance of the splenic vein with multiple perisplenic collateral  veins are in keeping with remote history of splenic/portal vein  thrombosis. Otherwise negative Doppler evaluation of the liver.  2. Splenomegaly  3. Findings of medical renal disease    CT abd/pelvis 2/29/24  1.  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 Slight decrease in splenomegaly with multifocal areas of  infarcts again noted.  2.  Kidneys are slightly atrophic with bilateral cysts suspected.  3.  Stable presumed intraparenchymal lymph node along the right major  fissure.  4.  Bilateral fat-containing inguinal hernias with extension of the  anterior bladder wall towards the left hernia.    The patient's records and results personally reviewed by this provider.     Outside records reviewed from: Care Everywhere      Assessment    Bao JEISON Campbelltex is a 63 year old male seen as a PAC referral for risk  "assessment and optimization for anesthesia.    Plan/Recommendations  Pt will be optimized for the proposed procedure.  See below for details on the assessment, risk, and preoperative recommendations    NEUROLOGY  - No history of TIA, CVA or seizure    -Post Op delirium risk factors:  High co-morbid index    ENT  - No current airway concerns.  Will need to be reassessed day of surgery.  Mallampati: Unable to assess  TM: Unable to assess  Some irregularity of upper teeth    CARDIAC  HLD. Will take Crestor on DOS. BP normal range. No other cardiac history, symptoms or meds. Good activity tolerance. Has had cardiac preop evaluation for kidney transplant with stress echocardiogram above.   - METS (Metabolic Equivalents)>4    RCRI: 0.9% risk of serious cardiac events    PULMONARY  Denies asthma, cough or use of inhaler  GREG with regular use of CPAP  - Tobacco History    History   Smoking Status    Never   Smokeless Tobacco    Never       GI: Denies GERD  Non-cirrhotic portal HYPERTENSION related to portal/splenic vein thrombosis in the setting of Protein C deficiency, complicated by remote variceal bleeding (2014), splenomegaly, thrombocytopenia, and coagulopathy  PONV Low Risk  Total Score: 1           1 AN PONV: Patient is not a current smoker        /RENAL  - Baseline Creatinine  4.90  IgA nephropathy, ESRD on hemodialysis M, W, F. via left upper arm AVF. Above scheduling has been complex and procedure is on dialysis day. He is working with his dialysis center to try to adjust his dialysis schedule if possible    ENDOCRINE    - BMI: Estimated body mass index is 39.78 kg/m  as calculated from the following:    Height as of this encounter: 1.702 m (5' 7\").    Weight as of this encounter: 115.2 kg (254 lb).  Obesity (BMI >30)  Prediabetes. A1C 6.0. Patient working on weight loss    HEME: VTE risk 3%  History of niece in family with protein C deficiency  Personal history of blood clots as above. Chronically " anticoagulated on Eliquis with plan from GI to hold for 2 days prior to procedure.     Denies personal or family history of bleeding disorder  History of blood transfusions   Last Hgb: 14.0  Oral iron supplement  Last platelet count 147    MSK: OA      Different anesthesia methods/types have been discussed with the patient, but they are aware that the final plan will be decided by the assigned anesthesia provider on the date of service.    The patient is optimized for their procedure. AVS with information on surgery time/arrival time, meds and NPO status given by nursing staff. No further diagnostic testing indicated.    Please refer to the physical examination documented by the anesthesiologist in the anesthesia record on the day of surgery.    Video-Visit Details    Type of service:  Video Visit    Provider received verbal consent for a Video Visit from the patient? Yes     Originating Location (pt. Location): Other work    Distant Location (provider location):  Off-site  Mode of Communication:  Video Conference via [employer] when couldn't hear patient using [employer]  On the day of service:     Prep time: 15 minutes  Visit time: 13 minutes  Documentation time: 15 minutes  ------------------------------------------  Total time: 43 minutes      SARAY Bolden CNS  Preoperative Assessment Center  [facility]  Cl[facility] and [facility] Center  Phone: [phone]  Fax: [phone]

## 2024-04-18 NOTE — PROGRESS NOTES
Marito is a 63 year old who is being evaluated via a billable video visit.    How would you like to obtain your AVS? MyChart  If the video visit is dropped, the invitation should be resent by: Text to cell phone: [phone]    Subjective   Marito is a 63 year old, presenting for the following health issues:  Pre-Op Exam    HPI         Physical Exam

## 2024-04-18 NOTE — TELEPHONE ENCOUNTER
Called the patient regarding his concerns. He was scheduled for an EGD, but he needed an EGD with portal pressure measurements. This was clarified with him and he expressed understanding. He talked about his ultrasound findings and the message I had previously sent. We talked about his MASLD and weight management as well. All of his questions and concerns were addressed. He felt comfortable with the plan of care.

## 2024-04-18 NOTE — PROGRESS NOTES
Attempted to reach patient to clarify any questions or concerns related to his upcoming procedure with Dr. Corrales on 5/13/24. LVM with clinic contact information.     Ayesha Pete RN Care Coordinator

## 2024-04-19 LAB — H PYLORI AG STL QL IA: NEGATIVE

## 2024-04-22 ENCOUNTER — TELEPHONE (OUTPATIENT)
Dept: TRANSPLANT | Facility: CLINIC | Age: 64
End: 2024-04-22
Payer: COMMERCIAL

## 2024-04-22 NOTE — TELEPHONE ENCOUNTER
Returned call to Carol Ann at dialysis. She was wondering if we can make arrangements for the pt to dialyze at [facility] following his procedure on 5/13/24.  I explained that I will reach out to the GI dept for further clarification on that and have them follow up with her. She was in agreement with the plan.

## 2024-04-22 NOTE — TELEPHONE ENCOUNTER
Patient Call: General  Route to LPN    Reason for call: Carol Ann GAR[facility]TT [facility] called to get some clarifications concerning dialysis. More details with call back.     Call back needed? Yes    Return Call Needed  Same as documented in contacts section  When to return call?: Same day: Route High Priority

## 2024-04-23 ENCOUNTER — TELEPHONE (OUTPATIENT)
Dept: TRANSPLANT | Facility: CLINIC | Age: 64
End: 2024-04-23
Payer: COMMERCIAL

## 2024-04-23 NOTE — TELEPHONE ENCOUNTER
Attempted to reach Carol Ann at pt's dialysis unit to discuss dialysis plan for upcoming procedure 5/13/24. Phone rang but no VM x2, will attempt again at a later time.

## 2024-04-24 ENCOUNTER — CARE COORDINATION (OUTPATIENT)
Dept: GASTROENTEROLOGY | Facility: CLINIC | Age: 64
End: 2024-04-24
Payer: COMMERCIAL

## 2024-04-24 ENCOUNTER — PATIENT OUTREACH (OUTPATIENT)
Dept: GASTROENTEROLOGY | Facility: CLINIC | Age: 64
End: 2024-04-24
Payer: COMMERCIAL

## 2024-04-24 NOTE — PROGRESS NOTES
Patient to be scheduled for an afternoon procedure slot with Dr. Corrales on 5/13/24 to accommodate his dialysis schedule. Patient may dialyze as usual that morning prior to reporting to [facility]. Discussed with Carol Ann from [facility] who states she will update patient.     Ayesha Pete RN Care Coordinator

## 2024-04-24 NOTE — PROGRESS NOTES
Contacted patient to discuss plan for procedure with Dr. Corrales on 5/13/24. Discussed that we would schedule his procedure in the afternoon to accommodate a morning dialysis run. Advised that the pre-op nursing team would contact him 1-2 days prior to procedure to finalize arrival time and NPO needs. Patient was grateful for the update. Provided with clinic contact information in the event of further questions or concerns.     Ayesha Pete RN Care Coordinator

## 2024-04-28 LAB
PROTOCOL CUTOFF: NORMAL
SA 1  COMMENTS: NORMAL
SA 1 CELL: NORMAL
SA 1 TEST METHOD: NORMAL
SA 2 CELL: NORMAL
SA 2 COMMENTS: NORMAL
SA 2 TEST METHOD: NORMAL
SA1 HI RISK ABY: NORMAL
SA1 MOD RISK ABY: NORMAL
SA2 HI RISK ABY: NORMAL
SA2 MOD RISK ABY: NORMAL
UNACCEPTABLE ANTIGENS: NORMAL
UNOS CPRA: 54

## 2024-05-13 ENCOUNTER — ANESTHESIA (OUTPATIENT)
Dept: SURGERY | Facility: CLINIC | Age: 64
End: 2024-05-13
Payer: COMMERCIAL

## 2024-05-13 ENCOUNTER — HOSPITAL ENCOUNTER (OUTPATIENT)
Facility: CLINIC | Age: 64
Discharge: HOME | End: 2024-05-13
Attending: INTERNAL MEDICINE | Admitting: INTERNAL MEDICINE
Payer: COMMERCIAL

## 2024-05-13 VITALS
HEIGHT: 67 IN | SYSTOLIC BLOOD PRESSURE: 123 MMHG | HEART RATE: 83 BPM | RESPIRATION RATE: 16 BRPM | WEIGHT: 257.5 LBS | OXYGEN SATURATION: 97 % | DIASTOLIC BLOOD PRESSURE: 72 MMHG | BODY MASS INDEX: 40.42 KG/M2 | TEMPERATURE: 97.9 F

## 2024-05-13 LAB
CREAT SERPL-MCNC: 3.85 MG/DL (ref 0.67–1.17)
EGFRCR SERPLBLD CKD-EPI 2021: 17 ML/MIN/1.73M2
PLATELET # BLD AUTO: 110 10E3/UL (ref 150–450)
POTASSIUM SERPL-SCNC: 4 MMOL/L (ref 3.4–5.3)

## 2024-05-13 PROCEDURE — 360N000082 HC SURGERY LEVEL 2 W/ FLUORO, PER MIN: Performed by: INTERNAL MEDICINE

## 2024-05-13 PROCEDURE — 36415 COLL VENOUS BLD VENIPUNCTURE: CPT | Performed by: CLINICAL NURSE SPECIALIST

## 2024-05-13 PROCEDURE — 43244 EGD VARICES LIGATION: CPT | Performed by: REGISTERED NURSE

## 2024-05-13 PROCEDURE — 250N000009 HC RX 250: Performed by: ANESTHESIOLOGY

## 2024-05-13 PROCEDURE — 85049 AUTOMATED PLATELET COUNT: CPT | Performed by: CLINICAL NURSE SPECIALIST

## 2024-05-13 PROCEDURE — 43244 EGD VARICES LIGATION: CPT | Performed by: ANESTHESIOLOGY

## 2024-05-13 PROCEDURE — 250N000025 HC SEVOFLURANE, PER MIN: Performed by: INTERNAL MEDICINE

## 2024-05-13 PROCEDURE — 999N000141 HC STATISTIC PRE-PROCEDURE NURSING ASSESSMENT: Performed by: INTERNAL MEDICINE

## 2024-05-13 PROCEDURE — 82565 ASSAY OF CREATININE: CPT | Performed by: CLINICAL NURSE SPECIALIST

## 2024-05-13 PROCEDURE — 84132 ASSAY OF SERUM POTASSIUM: CPT | Performed by: CLINICAL NURSE SPECIALIST

## 2024-05-13 PROCEDURE — 250N000011 HC RX IP 250 OP 636: Performed by: ANESTHESIOLOGY

## 2024-05-13 PROCEDURE — 710N000012 HC RECOVERY PHASE 2, PER MINUTE: Performed by: INTERNAL MEDICINE

## 2024-05-13 PROCEDURE — 272N000001 HC OR GENERAL SUPPLY STERILE: Performed by: INTERNAL MEDICINE

## 2024-05-13 PROCEDURE — 710N000010 HC RECOVERY PHASE 1, LEVEL 2, PER MIN: Performed by: INTERNAL MEDICINE

## 2024-05-13 PROCEDURE — 258N000003 HC RX IP 258 OP 636: Performed by: ANESTHESIOLOGY

## 2024-05-13 PROCEDURE — 370N000017 HC ANESTHESIA TECHNICAL FEE, PER MIN: Performed by: INTERNAL MEDICINE

## 2024-05-13 RX ORDER — LIDOCAINE HYDROCHLORIDE 20 MG/ML
INJECTION, SOLUTION INFILTRATION; PERINEURAL PRN
Status: DISCONTINUED | OUTPATIENT
Start: 2024-05-13 | End: 2024-05-13

## 2024-05-13 RX ORDER — FENTANYL CITRATE 50 UG/ML
25 INJECTION, SOLUTION INTRAMUSCULAR; INTRAVENOUS EVERY 5 MIN PRN
Status: DISCONTINUED | OUTPATIENT
Start: 2024-05-13 | End: 2024-05-13 | Stop reason: HOSPADM

## 2024-05-13 RX ORDER — DEXAMETHASONE SODIUM PHOSPHATE 4 MG/ML
4 INJECTION, SOLUTION INTRA-ARTICULAR; INTRALESIONAL; INTRAMUSCULAR; INTRAVENOUS; SOFT TISSUE
Status: DISCONTINUED | OUTPATIENT
Start: 2024-05-13 | End: 2024-05-13 | Stop reason: HOSPADM

## 2024-05-13 RX ORDER — FENTANYL CITRATE 50 UG/ML
INJECTION, SOLUTION INTRAMUSCULAR; INTRAVENOUS PRN
Status: DISCONTINUED | OUTPATIENT
Start: 2024-05-13 | End: 2024-05-13

## 2024-05-13 RX ORDER — ONDANSETRON 4 MG/1
4 TABLET, ORALLY DISINTEGRATING ORAL EVERY 30 MIN PRN
Status: DISCONTINUED | OUTPATIENT
Start: 2024-05-13 | End: 2024-05-13 | Stop reason: HOSPADM

## 2024-05-13 RX ORDER — ONDANSETRON 2 MG/ML
4 INJECTION INTRAMUSCULAR; INTRAVENOUS EVERY 6 HOURS PRN
Status: DISCONTINUED | OUTPATIENT
Start: 2024-05-13 | End: 2024-05-13 | Stop reason: HOSPADM

## 2024-05-13 RX ORDER — ONDANSETRON 2 MG/ML
4 INJECTION INTRAMUSCULAR; INTRAVENOUS EVERY 30 MIN PRN
Status: DISCONTINUED | OUTPATIENT
Start: 2024-05-13 | End: 2024-05-13 | Stop reason: HOSPADM

## 2024-05-13 RX ORDER — NALOXONE HYDROCHLORIDE 0.4 MG/ML
0.1 INJECTION, SOLUTION INTRAMUSCULAR; INTRAVENOUS; SUBCUTANEOUS
Status: DISCONTINUED | OUTPATIENT
Start: 2024-05-13 | End: 2024-05-13 | Stop reason: HOSPADM

## 2024-05-13 RX ORDER — SODIUM CHLORIDE, SODIUM LACTATE, POTASSIUM CHLORIDE, CALCIUM CHLORIDE 600; 310; 30; 20 MG/100ML; MG/100ML; MG/100ML; MG/100ML
INJECTION, SOLUTION INTRAVENOUS CONTINUOUS
Status: DISCONTINUED | OUTPATIENT
Start: 2024-05-13 | End: 2024-05-13 | Stop reason: HOSPADM

## 2024-05-13 RX ORDER — FLUMAZENIL 0.1 MG/ML
0.2 INJECTION, SOLUTION INTRAVENOUS
Status: DISCONTINUED | OUTPATIENT
Start: 2024-05-13 | End: 2024-05-13 | Stop reason: HOSPADM

## 2024-05-13 RX ORDER — SODIUM CHLORIDE, SODIUM LACTATE, POTASSIUM CHLORIDE, CALCIUM CHLORIDE 600; 310; 30; 20 MG/100ML; MG/100ML; MG/100ML; MG/100ML
INJECTION, SOLUTION INTRAVENOUS CONTINUOUS PRN
Status: DISCONTINUED | OUTPATIENT
Start: 2024-05-13 | End: 2024-05-13

## 2024-05-13 RX ORDER — EPHEDRINE SULFATE 50 MG/ML
INJECTION, SOLUTION INTRAMUSCULAR; INTRAVENOUS; SUBCUTANEOUS PRN
Status: DISCONTINUED | OUTPATIENT
Start: 2024-05-13 | End: 2024-05-13

## 2024-05-13 RX ORDER — OXYCODONE HYDROCHLORIDE 5 MG/1
5 TABLET ORAL
Status: DISCONTINUED | OUTPATIENT
Start: 2024-05-13 | End: 2024-05-13 | Stop reason: HOSPADM

## 2024-05-13 RX ORDER — NALOXONE HYDROCHLORIDE 0.4 MG/ML
0.2 INJECTION, SOLUTION INTRAMUSCULAR; INTRAVENOUS; SUBCUTANEOUS
Status: DISCONTINUED | OUTPATIENT
Start: 2024-05-13 | End: 2024-05-13 | Stop reason: HOSPADM

## 2024-05-13 RX ORDER — PROPOFOL 10 MG/ML
INJECTION, EMULSION INTRAVENOUS PRN
Status: DISCONTINUED | OUTPATIENT
Start: 2024-05-13 | End: 2024-05-13

## 2024-05-13 RX ORDER — ONDANSETRON 4 MG/1
4 TABLET, ORALLY DISINTEGRATING ORAL EVERY 6 HOURS PRN
Status: DISCONTINUED | OUTPATIENT
Start: 2024-05-13 | End: 2024-05-13 | Stop reason: HOSPADM

## 2024-05-13 RX ORDER — HYDROMORPHONE HCL IN WATER/PF 6 MG/30 ML
0.4 PATIENT CONTROLLED ANALGESIA SYRINGE INTRAVENOUS EVERY 5 MIN PRN
Status: DISCONTINUED | OUTPATIENT
Start: 2024-05-13 | End: 2024-05-13 | Stop reason: HOSPADM

## 2024-05-13 RX ORDER — DEXAMETHASONE SODIUM PHOSPHATE 4 MG/ML
INJECTION, SOLUTION INTRA-ARTICULAR; INTRALESIONAL; INTRAMUSCULAR; INTRAVENOUS; SOFT TISSUE PRN
Status: DISCONTINUED | OUTPATIENT
Start: 2024-05-13 | End: 2024-05-13

## 2024-05-13 RX ORDER — LIDOCAINE 40 MG/G
CREAM TOPICAL
Status: DISCONTINUED | OUTPATIENT
Start: 2024-05-13 | End: 2024-05-13 | Stop reason: HOSPADM

## 2024-05-13 RX ORDER — NALOXONE HYDROCHLORIDE 0.4 MG/ML
0.4 INJECTION, SOLUTION INTRAMUSCULAR; INTRAVENOUS; SUBCUTANEOUS
Status: DISCONTINUED | OUTPATIENT
Start: 2024-05-13 | End: 2024-05-13 | Stop reason: HOSPADM

## 2024-05-13 RX ORDER — PHENYLEPHRINE HYDROCHLORIDE 10 MG/ML
INJECTION INTRAVENOUS PRN
Status: DISCONTINUED | OUTPATIENT
Start: 2024-05-13 | End: 2024-05-13

## 2024-05-13 RX ORDER — ONDANSETRON 2 MG/ML
INJECTION INTRAMUSCULAR; INTRAVENOUS PRN
Status: DISCONTINUED | OUTPATIENT
Start: 2024-05-13 | End: 2024-05-13

## 2024-05-13 RX ORDER — FENTANYL CITRATE 50 UG/ML
50 INJECTION, SOLUTION INTRAMUSCULAR; INTRAVENOUS EVERY 5 MIN PRN
Status: DISCONTINUED | OUTPATIENT
Start: 2024-05-13 | End: 2024-05-13 | Stop reason: HOSPADM

## 2024-05-13 RX ORDER — OXYCODONE HYDROCHLORIDE 10 MG/1
10 TABLET ORAL
Status: DISCONTINUED | OUTPATIENT
Start: 2024-05-13 | End: 2024-05-13 | Stop reason: HOSPADM

## 2024-05-13 RX ORDER — HYDROMORPHONE HCL IN WATER/PF 6 MG/30 ML
0.2 PATIENT CONTROLLED ANALGESIA SYRINGE INTRAVENOUS EVERY 5 MIN PRN
Status: DISCONTINUED | OUTPATIENT
Start: 2024-05-13 | End: 2024-05-13 | Stop reason: HOSPADM

## 2024-05-13 RX ADMIN — EPHEDRINE SULFATE 10 MG: 5 INJECTION INTRAVENOUS at 14:57

## 2024-05-13 RX ADMIN — PHENYLEPHRINE HYDROCHLORIDE 200 MCG: 10 INJECTION INTRAVENOUS at 15:28

## 2024-05-13 RX ADMIN — LIDOCAINE HYDROCHLORIDE 100 MG: 20 INJECTION, SOLUTION INFILTRATION; PERINEURAL at 14:41

## 2024-05-13 RX ADMIN — Medication 10 MG: at 15:18

## 2024-05-13 RX ADMIN — MIDAZOLAM 1 MG: 1 INJECTION INTRAMUSCULAR; INTRAVENOUS at 14:33

## 2024-05-13 RX ADMIN — DEXAMETHASONE SODIUM PHOSPHATE 8 MG: 4 INJECTION, SOLUTION INTRA-ARTICULAR; INTRALESIONAL; INTRAMUSCULAR; INTRAVENOUS; SOFT TISSUE at 14:42

## 2024-05-13 RX ADMIN — EPHEDRINE SULFATE 5 MG: 5 INJECTION INTRAVENOUS at 15:16

## 2024-05-13 RX ADMIN — FENTANYL CITRATE 100 MCG: 50 INJECTION INTRAMUSCULAR; INTRAVENOUS at 14:41

## 2024-05-13 RX ADMIN — EPHEDRINE SULFATE 5 MG: 5 INJECTION INTRAVENOUS at 15:12

## 2024-05-13 RX ADMIN — EPHEDRINE SULFATE 5 MG: 5 INJECTION INTRAVENOUS at 15:08

## 2024-05-13 RX ADMIN — PROPOFOL 200 MG: 10 INJECTION, EMULSION INTRAVENOUS at 14:42

## 2024-05-13 RX ADMIN — ONDANSETRON 4 MG: 2 INJECTION INTRAMUSCULAR; INTRAVENOUS at 14:53

## 2024-05-13 RX ADMIN — PHENYLEPHRINE HYDROCHLORIDE 100 MCG: 10 INJECTION INTRAVENOUS at 14:50

## 2024-05-13 RX ADMIN — PHENYLEPHRINE HYDROCHLORIDE 200 MCG: 10 INJECTION INTRAVENOUS at 15:23

## 2024-05-13 RX ADMIN — SUGAMMADEX 200 MG: 100 INJECTION, SOLUTION INTRAVENOUS at 15:33

## 2024-05-13 RX ADMIN — PHENYLEPHRINE HYDROCHLORIDE 200 MCG: 10 INJECTION INTRAVENOUS at 14:58

## 2024-05-13 RX ADMIN — SODIUM CHLORIDE, POTASSIUM CHLORIDE, SODIUM LACTATE AND CALCIUM CHLORIDE: 600; 310; 30; 20 INJECTION, SOLUTION INTRAVENOUS at 14:38

## 2024-05-13 RX ADMIN — Medication 40 MG: at 14:42

## 2024-05-13 RX ADMIN — PHENYLEPHRINE HYDROCHLORIDE 200 MCG: 10 INJECTION INTRAVENOUS at 14:52

## 2024-05-13 ASSESSMENT — ACTIVITIES OF DAILY LIVING (ADL)
ADLS_ACUITY_SCORE: 29

## 2024-05-13 NOTE — BRIEF OP NOTE
[facility] Brief Operative Note    Pre-operative diagnosis: Esophageal varices (H) [I85.00]  Gastric varices [I86.4]  Portal hypertension (H) [K76.6]   Post-operative diagnosis * No post-op diagnosis entered *   Procedure: Procedure(s):  ESOPHAGOGASTRODUODENOSCOPY  ENDOSCOPIC ULTRASOUND, ESOPHAGOSCOPY / UPPER GASTROINTESTINAL TRACT (GI)   Surgeon: Guru Savanna MD   Assistants(s):    Estimated blood loss: None    Specimens: None   Findings:       EUS    Esophageal varices were seen    EGD Variceal banding performed x 6    Recommendations    Repeat EGD in 4-6 weeks in [facility] for variceal surveillance

## 2024-05-13 NOTE — ANESTHESIA POSTPROCEDURE EVALUATION
Patient: Bao Keenan    Procedure: Procedure(s):  ESOPHAGOGASTRODUODENOSCOPY, WITH BANDING OF VARICES  ENDOSCOPIC ULTRASOUND, ESOPHAGOSCOPY / UPPER GASTROINTESTINAL TRACT (GI)       Anesthesia Type:  General    Note:  Disposition: Outpatient   Postop Pain Control: Uneventful            Sign Out: Well controlled pain   PONV: No   Neuro/Psych: Uneventful            Sign Out: Acceptable/Baseline neuro status   Airway/Respiratory: Uneventful            Sign Out: Acceptable/Baseline resp. status   CV/Hemodynamics: Uneventful            Sign Out: Acceptable CV status; No obvious hypovolemia; No obvious fluid overload   Other NRE: NONE   DID A NON-ROUTINE EVENT OCCUR? No    Event details/Postop Comments:  No complications.           Last vitals:  Vitals Value Taken Time   /61 05/13/24 1545   Temp 36.7  C (98  F) 05/13/24 1545   Pulse 82 05/13/24 1552   Resp 23 05/13/24 1552   SpO2 100 % 05/13/24 1552   Vitals shown include unfiled device data.    Electronically Signed By: Ish Mcfadden MD  May 13, 2024  3:52 PM

## 2024-05-13 NOTE — ANESTHESIA PREPROCEDURE EVALUATION
Anesthesia Pre-Procedure Evaluation    Patient: Bao Keenan   MRN: [id number] : 1960        Procedure : Procedure(s):  ESOPHAGOGASTRODUODENOSCOPY  ENDOSCOPIC ULTRASOUND, ESOPHAGOSCOPY / UPPER GASTROINTESTINAL TRACT (GI)          Past Medical History:   Diagnosis Date    Anemia     Antiplatelet or antithrombotic long-term use     Arthritis     Right Knee    Coagulation disorder (H24)     Protein C deficiency    Esophageal varices (H)     Gastro-oesophageal reflux disease     History of blood transfusion     Hypertension     IgA nephropathy     Liver disease     non alcoholic fatty liver    GREG (obstructive sleep apnea)     Portal hypertension (H)     Renal disease     CKD    Thrombosis of leg     +DVT      Past Surgical History:   Procedure Laterality Date    BIOPSY          COLONOSCOPY      ENT SURGERY      tonsils    EYE SURGERY      lasik    IR IVC FILTER PLACEMENT      IR IVC FILTER REMOVAL  2014    TIPS PROCEDURE        No Known Allergies   Social History     Tobacco Use    Smoking status: Never    Smokeless tobacco: Never   Substance Use Topics    Alcohol use: No      Wt Readings from Last 1 Encounters:   24 116.8 kg (257 lb 8 oz)        Anesthesia Evaluation        History of anesthetic complications       ROS/MED HX  ENT/Pulmonary:     (+) sleep apnea,                                       Neurologic:       Cardiovascular:     (+)  hypertension- -   -  - -   Taking blood thinners                                   METS/Exercise Tolerance:     Hematologic:     (+) History of blood clots,    pt is anticoagulated,           Musculoskeletal:       GI/Hepatic:     (+) GERD,            liver disease,       Renal/Genitourinary:     (+) renal disease,             Endo:       Psychiatric/Substance Use:       Infectious Disease:       Malignancy:       Other:            Physical Exam    Airway        Mallampati: II       Respiratory Devices and Support         Dental       (+) Minor  "Abnormalities - some fillings, tiny chips      Cardiovascular          Rhythm and rate: regular and normal     Pulmonary                   OUTSIDE LABS:  CBC:   Lab Results   Component Value Date    WBC 6.7 04/11/2024    WBC 5.1 02/08/2024    HGB 14.0 04/11/2024    HGB 13.8 02/08/2024    HCT 42.7 04/11/2024    HCT 39.8 (L) 02/08/2024     (L) 04/11/2024     (L) 02/08/2024     BMP:   Lab Results   Component Value Date     04/11/2024     02/08/2024    POTASSIUM 5.3 04/11/2024    POTASSIUM 3.6 02/08/2024    CHLORIDE 99 04/11/2024    CHLORIDE 96 (L) 02/08/2024    CO2 27 04/11/2024    CO2 26 02/08/2024    BUN 37.8 (H) 04/11/2024    BUN 38.7 (H) 02/08/2024    CR 4.90 (H) 04/11/2024    CR 4.36 (H) 02/08/2024     (H) 04/11/2024     (H) 02/08/2024     COAGS:   Lab Results   Component Value Date    PTT 38 02/08/2024    INR 1.30 (H) 04/11/2024     POC: No results found for: \"BGM\", \"HCG\", \"HCGS\"  HEPATIC:   Lab Results   Component Value Date    ALBUMIN 4.7 04/11/2024    PROTTOTAL 8.7 (H) 04/11/2024    ALT 17 04/11/2024    AST 33 04/11/2024    ALKPHOS 69 04/11/2024    BILITOTAL 0.8 04/11/2024     OTHER:   Lab Results   Component Value Date    A1C 6.0 (H) 02/08/2024    RAQUEL 10.1 04/11/2024       Anesthesia Plan    ASA Status:  3       Anesthesia Type: General.   Induction: Intravenous.           Consents    Anesthesia Plan(s) and associated risks, benefits, and realistic alternatives discussed. Questions answered and patient/representative(s) expressed understanding.     - Discussed:     - Discussed with:  Patient            Postoperative Care    Pain management: Multi-modal analgesia.   PONV prophylaxis: Ondansetron (or other 5HT-3), Dexamethasone or Solumedrol     Comments:               PK PEARSON MD    I have reviewed the pertinent notes and labs in the chart from the past 30 days and (re)examined the patient.  Any updates or changes from those notes are reflected in this note.   " "         # Drug Induced Coagulation Defect: home medication list includes an anticoagulant medication   # Severe Obesity: Estimated body mass index is 40.33 kg/m  as calculated from the following:    Height as of this encounter: 1.702 m (5' 7\").    Weight as of this encounter: 116.8 kg (257 lb 8 oz).      "

## 2024-05-13 NOTE — ANESTHESIA CARE TRANSFER NOTE
Patient: Bao Keenan    Procedure: Procedure(s):  ESOPHAGOGASTRODUODENOSCOPY, WITH BANDING OF VARICES  ENDOSCOPIC ULTRASOUND, ESOPHAGOSCOPY / UPPER GASTROINTESTINAL TRACT (GI)       Diagnosis: Esophageal varices (H) [I85.00]  Gastric varices [I86.4]  Portal hypertension (H) [K76.6]  Diagnosis Additional Information: No value filed.    Anesthesia Type:   General     Note:    Oropharynx: oropharynx clear of all foreign objects and spontaneously breathing  Level of Consciousness: awake  Oxygen Supplementation: nasal cannula  Level of Supplemental Oxygen (L/min / FiO2): 3  Independent Airway: airway patency satisfactory and stable  Dentition: dentition unchanged  Vital Signs Stable: post-procedure vital signs reviewed and stable  Report to RN Given: handoff report given  Patient transferred to: PACU    Handoff Report: Identifed the Patient, Identified the Reponsible Provider, Reviewed the pertinent medical history, Discussed the surgical course, Reviewed Intra-OP anesthesia mangement and issues during anesthesia, Set expectations for post-procedure period and Allowed opportunity for questions and acknowledgement of understanding      Vitals:  Vitals Value Taken Time   /61 05/13/24 1545   Temp 36.7  C (98  F) 05/13/24 1545   Pulse 83 05/13/24 1547   Resp 20 05/13/24 1547   SpO2 100 % 05/13/24 1547   Vitals shown include unfiled device data.    Electronically Signed By: SARAY Diaz CRNA  May 13, 2024  3:48 PM

## 2024-05-13 NOTE — ANESTHESIA PROCEDURE NOTES
Airway       Patient location during procedure: OR       Procedure Start/Stop Times: 5/13/2024 2:46 PM  Staff -        CRNA: Claudio Beltran APRN CRNA       Performed By: CRNA  Consent for Airway        Urgency: elective  Indications and Patient Condition       Indications for airway management: kaitlin-procedural       Induction type:intravenous       Mask difficulty assessment: 1 - vent by mask    Final Airway Details       Final airway type: endotracheal airway       Successful airway: ETT - single and Oral  Endotracheal Airway Details        ETT size (mm): 7.0       Cuffed: yes       Successful intubation technique: direct laryngoscopy       DL Blade Type: Mallory 2       Grade View of Cords: 1       Adjucts: stylet       Position: Right       Measured from: gums/teeth       Secured at (cm): 23       Bite block used: Oral Airway (At end of case)    Post intubation assessment        Placement verified by: capnometry, equal breath sounds and chest rise        Number of attempts at approach: 1       Number of other approaches attempted: 0       Secured with: tape       Ease of procedure: easy       Dentition: Intact and Unchanged    Medication(s) Administered   Medication Administration Time: 5/13/2024 2:46 PM

## 2024-05-13 NOTE — DISCHARGE INSTRUCTIONS
From Dr. Corrales:  Repeat EGD in 4-6 weeks in [facility] for variceal surveillance               [facility], [facility]  Anesthesia Discharge Instructions      After Anesthesia (Sleep Medicine)  What should I do after anesthesia?  You should rest and relax for the next 24 hours. Avoid risky or difficult (strenuous) activity. A responsible adult should stay with you overnight.  Don't drive or use any heavy equipment for 24 hours. Even if you feel normal, your reactions may be affected by the sleep medicine given to you.  Don't drink alcohol or make any important decisions for 24 hours.  Slowly get back to your regular diet, as you feel able.    How should I expect to feel?  It's normal to feel dizzy, light-headed, or faint for up to a full day after anesthesia or while taking pain medicine. If this happens:   Sit down for a few minutes before standing.  Have someone help you when you get up to walk or use the bathroom.  If you have nausea (feel sick to your stomach) or vomit (throw up):   Drink clear liquids (such as apple juice, ginger ale, broth, or 7UP) until you feel better. Stay hydrated.  If you feel sick to your stomach, or you keep vomiting for 24 hours, please call the doctor.    What else should I know?  You might have a dry mouth, sore throat, muscle aches, or trouble sleeping. These should go away after 24 hours.  Please contact your doctor if you have any other symptoms that concern you, such as fever, pain, bleeding, fluid drainage, swelling, or headache, or if it's been over 8 to 10 hours and you still aren't able to pee (urinate).  If you have a history of sleep apnea, it's very important to use your CPAP machine for the next 24 hours when you nap or sleep.     To contact a doctor, call Dr. Guru Corrales @ [phone] (GI Clinic) or [phone] OR :  Call [phone] for the [facility] hospital, and ask for the resident on call for Gastroenterology.  For any  emergencies, please go to the emergency room.

## 2024-05-21 ENCOUNTER — PATIENT OUTREACH (OUTPATIENT)
Dept: GASTROENTEROLOGY | Facility: CLINIC | Age: 64
End: 2024-05-21
Payer: COMMERCIAL

## 2024-05-21 DIAGNOSIS — I85.00 ESOPHAGEAL VARICES (H): Primary | ICD-10-CM

## 2024-05-21 NOTE — PROGRESS NOTES
Post EGD/EUS on 5/13/24 with Dr. Corrales.      Follow-up recommendations:   EUS: Esophageal varices were seen     EGD: Variceal banding performed x 6     Recommendations  Repeat EGD in 4-6 weeks in [facility] for variceal surveillance    Patient states:     Orders placed:   Per endoscopy scheduling, MAC availability on 6/27/24 @ 1:15 PM for repeat EGD.     Reviewed post procedure recommendations and discussed symptoms to report to our clinic. Patient articulated understanding.     Clinic contact and scheduling numbers verified for future questions/concerns.     Ayesha Pete, RN Care Coordinator

## 2024-05-22 ENCOUNTER — COMMITTEE REVIEW (OUTPATIENT)
Dept: TRANSPLANT | Facility: CLINIC | Age: 64
End: 2024-05-22
Payer: COMMERCIAL

## 2024-05-22 ENCOUNTER — TELEPHONE (OUTPATIENT)
Dept: TRANSPLANT | Facility: CLINIC | Age: 64
End: 2024-05-22
Payer: COMMERCIAL

## 2024-05-22 ENCOUNTER — TELEPHONE (OUTPATIENT)
Dept: GASTROENTEROLOGY | Facility: CLINIC | Age: 64
End: 2024-05-22
Payer: COMMERCIAL

## 2024-05-22 NOTE — COMMITTEE REVIEW
Kidney/Pancreas Committee Review Note     Evaluation Date: 2/8/2024  Committee Review Date: 5/22/2024    Organ being evaluated for: Kidney    Transplant Phase: Evaluation  Transplant Status: Active    Transplant Coordinator: Gracie Carbajal  Transplant Surgeon: Dr. Hussain Farmer     Referring Physician: Dr. Anderson Davey    Primary Diagnosis: IgA Nephropathy  Secondary Diagnosis:     Committee Review Members:  Nephrology Marcelo Aquino MD, Ashanti Aguirre MD   Nurse Ara Jesus, APRN CNP, Diana Graves, RN, Akila Leggett, APRN CNP, Timmy Lopez, APRN CNP, Jeanne Basilio, NP, TI MONTANEZ, RN   Nutrition Jordyn Saldivar, RD   Pharmacist Regina Nix, [facility]    - Clinical Jacqueline Wheat, MSW, Mark Martin, MSW, Ella Schwab, MSW   Transplant GAGAN FLORES, RN, Yelena Flores, TAMI, Lenora Mike, TAMI, Diana Graves, RN, Gracie Carbajal, RN, Deepti Hicks, RN, Jeanne Colón, RN, Lui Chaudhary, TAMI, Nae Cunningham, RN, Charlette Ly, TAMI, Bita Snell, RN   Transplant Surgery Thanh Oseguera MD       Transplant Eligibility: Irreversible chronic kidney disease treated w/dialysis or expected need for dialysis    Committee Review Decision: Approved    Relative Contraindications: None    Absolute Contraindications: None     Committee Chair Thanh Oseguera MD verbally attested to the committee's decision.    Committee Discussion Details: Reviewed pt's medical status and evaluation results to date.  It was discussed that the pt has a potential direct donor who is not yet fully approved as he will need to work on weight loss.     Resolved Issues:  Hx of gross and Microscopic Hematuria: pt saw Dr. Bonner w/ Urology on 3/26/24 and was cleared with a negative cystoscopy     Noted to be Prediabetic w/ Hgb A1c 6% on 2/8/24 - PCP is managing       Cardiac hx: pt has a history of cardiomyopathy of unclear etiology with EF 40-45% in 2014 (which has since recovered) as well as 1st degree AV  block. His last echo on 24 showed an EF of 55-60%. Pt saw Dr. Vergara for risk assessment on 24 and underwent exercise stress on 3/5/24, he reached 84% of the age predicted maximal heart rate- max predicted was 159, pt reached 135, and was cleared to proceed w/ transplant.      Abdominal Imaging: pt completed Ab/Pelv CT and iliac US on 24 that were reviewed and approved by Dr. Blacno. The pt had a normal Hemoglobin of 13.8 at [facility] 24, therefore he underwent a renal US on 24 which showed a left superior pole stone and simple renal cysts.    BMI: Pt's BMI was 41 (262 lbs) at [facility] 24 and Dr. Farmer noted that his weight not a contraindication and he can move ahead with kidney transplant but he should continue to work on weight loss.  Pt is is currently at a BMI of 39.78 (254 lbs).     Health Maintenance: Colonoscopy done on 23 and Dental: Up to date.    Unresolved Issues:  Protein C Deficiency with Recurrent DVT: multiple episodes, including portal vein thrombosis. He has been anticoagulated since about , history of IVC filter, since removed.  Pt is currently on Eliquis, and would need to transition back to Coumadin prior to listing on the  donor wait list. May remain on Eliquis based on timing of a living donor transplant.    Hepatology clearance: Pt has hx of Portal HTN/Gastropathy (noted on EGD ) Portal Vein Thrombosis, Esophageal Varices (s/p banding ), Partial splenic artery embolization, venous embolization of gastric varices and splenorenal shunts. He saw Dr. Tomlinson on 24 and underwent unremarkable Abdominal US 24.  He underwent EGD on 24 that revealed large esophageal varices that were banded, he will have a follow up EGD on 24.  Dr. Tomlinson and Dr. Aquino discussed the pt's case.  The Committee discussed that the pt's portal HTN is related to his portal venous clot rather than underlying cirrhosis, and therefore may not be a  contraindication in moving forward with kidney transplant. It was noted that he has a normal Plt count, no ascites, and no cirrhosis. The Committee would like the input of Dr. Blanco to determine if he may be eligible to move forward with renal transplant.    Will call pt with update and send evaluation summary letter. Pt will be represented following discussion w/ Dr. Myers.

## 2024-05-22 NOTE — TELEPHONE ENCOUNTER
Screening questions completed on 4/15/2024, no changes per patient.       Final Scheduling Details     Procedure scheduled  Upper endoscopy (EGD)    Surgeon:       Date of procedure:  6/27/2024     Pre-OP / PAC:   Yes - PAC clinic evaluation scheduled.    Location  [facility] - Per exclusion criteria.    Sedation   MAC/Deep Sedation - Per order.      Patient Reminders:   You will receive a call from a Nurse to review instructions and health history.  This assessment must be completed prior to your procedure.  Failure to complete the Nurse assessment may result in the procedure being cancelled.      On the day of your procedure, please designate an adult(s) who can drive you home stay with you for the next 24 hours. The medicines used in the exam will make you sleepy. You will not be able to drive.      You cannot take public transportation, ride share services, or non-medical taxi service without a responsible caregiver.  Medical transport services are allowed with the requirement that a responsible caregiver will receive you at your destination.  We require that drivers and caregivers are confirmed prior to your procedure.

## 2024-05-22 NOTE — TELEPHONE ENCOUNTER
Called pt to let him know that the Selection Committee reviewed his case today and that we would like to get input from Dr. Blanco regarding his esophageal varices and portal HTN to ensure we can proceed w/ kidney transplant. We discussed that if we can move forward, we will need to wait to see the results of his next EGD on 6/27/24 prior to listing.  We also discussed that at that time, we will determine if he needs to go on Coumadin for the DDKT list or if he has an approved LD, then he could remain on Eliquis. He was wondering if his dialysis fistula could have any relationship to his portal HTN, to which I let him know I will ask Nephrology to comment on. I let him know that we will follow up with him after the conversation w/ Dr. Blanco. He had no further questions and was in good agreement with the plan.

## 2024-05-23 ENCOUNTER — MYC MEDICAL ADVICE (OUTPATIENT)
Dept: GASTROENTEROLOGY | Facility: CLINIC | Age: 64
End: 2024-05-23
Payer: COMMERCIAL

## 2024-05-23 ENCOUNTER — TELEPHONE (OUTPATIENT)
Dept: GASTROENTEROLOGY | Facility: CLINIC | Age: 64
End: 2024-05-23
Payer: COMMERCIAL

## 2024-05-23 ENCOUNTER — TELEPHONE (OUTPATIENT)
Dept: TRANSPLANT | Facility: CLINIC | Age: 64
End: 2024-05-23
Payer: COMMERCIAL

## 2024-05-23 ENCOUNTER — DOCUMENTATION ONLY (OUTPATIENT)
Dept: MULTI SPECIALTY CLINIC | Facility: CLINIC | Age: 64
End: 2024-05-23
Payer: COMMERCIAL

## 2024-05-23 NOTE — TELEPHONE ENCOUNTER
Pt called to let me know he was contacted for a Hepatology consult in August w/ a different provider and he is refusing the visit. I explained to him that I am not sure of the purpose of the visit but I would imagine it is regular follow up. He has asked me to reach out to Hepatology to clarify, I agreed to do so.

## 2024-05-23 NOTE — TELEPHONE ENCOUNTER
called to schedule August follow up w Dr. Sauer, per Dr. Tomlinson's instruction, pt declined to schedule; sent message to Dr. Tomlinson- MEHDI 5.23.24//

## 2024-05-23 NOTE — TELEPHONE ENCOUNTER
FUTURE VISIT INFORMATION      SURGERY INFORMATION:  Date: 24  Location:  gi  Surgeon:  Guru Meseret Corrales MD   Anesthesia Type:  mac  Procedure: Esophagoscopy, gastroscopy, duodenoscopy (EGD), combined     RECORDS REQUESTED FROM:       Primary Care Provider:  Stephani Eric MD  - [facility]    Pertinent Medical History: hypertension    Most recent EKG+ Tracin24    Most recent ECHO: 24    Most recent Cardiac Stress Test: 3/5/24

## 2024-05-23 NOTE — PROGRESS NOTES
I called the patient regarding his message.  The patient has sent me several irate messages including personal attacks.    I have discussed this case with the patient's medical transplant nephrologist (Dr. Aquino) as well as the kidney transplant coordinator.    The patient has non-cirrhotic portal hypertension related to a portal vein thrombosis.  The patient recently underwent an upper endoscopy and planned for EUS.  The upper endoscopy demonstrated large varices that required banding. EUS with portal pressure measurements was not able to be performed due to anatomy.     The patient's portal hypertension is driven by the patient's portal vein thrombosis (not cirrhosis) and not any underlying liver disease. The patient has underlying fat changes in his liver but no signs of advanced fibrosis or cirrhosis.    Given the patient has noncirrhotic portal hypertension he would benefit from following up with a hepatologist which is why the patient will be scheduled to follow up with Dr. Sauer (hepatology [facility]) as long term follow up.    I have answered all his questions and concerns. Given the patient's messages to me have become personal I think it would beneficial if the patient saw another provider.

## 2024-05-31 ENCOUNTER — TELEPHONE (OUTPATIENT)
Dept: TRANSPLANT | Facility: CLINIC | Age: 64
End: 2024-05-31
Payer: COMMERCIAL

## 2024-05-31 NOTE — TELEPHONE ENCOUNTER
Received call from pt wondering about the status of his case. I let him know I am awaiting a reply from the providers and will let him know as soon as I have an update. He stated he may look into getting a second opinion at [place]. He had no further questions.

## 2024-06-04 ENCOUNTER — TELEPHONE (OUTPATIENT)
Dept: TRANSPLANT | Facility: CLINIC | Age: 64
End: 2024-06-04
Payer: COMMERCIAL

## 2024-06-04 NOTE — LETTER
PHYSICIAN ORDER   ALA/PRA BLOOD    DATE & TIME ISSUED: 2024 3:17 PM  PATIENT NAME: Bao Keenan   : 1960     [facility] MR# [if applicable]: [id number]     DIAGNOSIS/ICD-10 CODE: Awaiting Organ transplant [Z76.82}  EXPIRES: (1 YEAR AFTER DATE ISSUED)  Every 3 months: due next on 7/15/24, for any questions, please call TAMI Bowman [phone]    Please draw 20ml of blood in red top (plain) tube for Antileukocyte Antibody (ALA or PRA).  Label tubes with the patient s name, complete lab slip.    Mailers, lab slips with instructions are sent to patient separately.  Call the Outreach Lab at [phone] to reorder mailers.  Mail blood to (this address is also on the mailers):    IM[facility]Y [facility]   [facility]    [address] [address]      .  Adalid Almendarez in Immunology and Transplantation  Surgical Director, Kidney & Pancreas Transplant Programs  Medical Director, Solid Organ Transplant Unit

## 2024-06-04 NOTE — LETTER
06/05/24        Bao Keenan  [address] [address] [facility] [address]        Dear Bao,    It is a pleasure to work with you toward the goal of kidney transplantation. Your pre-transplant evaluation results to date were reviewed at our Multidisciplinary Selection Committee on 6/5/24.  The Committee has approved you to move forward with a living donor kidney transplant.  While your donor completes their evaluation, we will work with Dr. Eric to devise an anticoagulation plan for the kaitlin-operative period.  Please continue regular follow up with your established care team and update me of any changes to your health while you await transplant.     For any questions, please contact the Transplant Office at [phone] or you may reach me directly at [phone].      Sincerely,  Gracie Carbajal RN, Pre-Kidney/Pancreas Transplant Coordinator   Solid Organ Transplant  [facility], [facility]    CC: Care Team

## 2024-06-05 ENCOUNTER — COMMITTEE REVIEW (OUTPATIENT)
Dept: TRANSPLANT | Facility: CLINIC | Age: 64
End: 2024-06-05
Payer: COMMERCIAL

## 2024-06-05 NOTE — TELEPHONE ENCOUNTER
Called pt and discussed that the Committee has approved him to move forward w/ an LDKT. I let him know we will want to determine a plan for anticoagulation, he stated that his PCP is managing his Eliquis currently - I will reach out to determine plan. We discussed that he will need an updated PRA due 7/15/24 - I will send his dialysis unit an order. We discussed that he will need to continue w/ upcoming EGD and should inform me of any changes to his health. He had no further questions at this time and was in good agreement w/ the plan.    Called pt's PCP office - follows w/ Dr. Stephani Eric, left message for care team to return my call regarding kaitlin-operative anticoagulation plan.

## 2024-06-05 NOTE — COMMITTEE REVIEW
Abdominal Patient Discussion Note Transplant Coordinator: Gracie Carbajal  Transplant Surgeon: Dr. Hussain Farmer      Referring Physician: Dr. Anderson Davey    Committee Review Members:  Nephrology Marcelo Aquino MD, Jose Contreras MD   Nutrition Jordyn Saldivar, PATTI   Pharmacist Flor Wsetfall, [facility]   Transplant GAGAN FLORES, RN, Yelena Flores, RN, Lenora Mike, RN, Gera Solomon MD, Gracie Carbajal, TAMI, Deepti Hicks, RN, Jeanne Basilio, NP, Jeanne Colón, RN, Nae Cunningham, RN, Charlette Ly, TAMI, Bita Snell, TAMI       Additional Discussion Notes and Findings: The Committee last reviewed the pt's pre-kidney transplant evaluation on 5/22/24 (see note) and the Committee wanted Dr. Blanco's input on his candidacy d/t his hx of portal HTN.  Dr. Aquino and Dr. Blanco reviewed the pt's case and have approved him to move forward with LDKT as he has a donor being evaluated. We will work with the prescriber of his Eliquis to formulate a plan for the kaitlin-operative period. The pt has an upcoming repeat EGD to reassess his esophageal varices that were recently banded on 5/13/24 - per Dr. Aquino this should not impact his transplant candidacy. The pt will be called and updated, evaluation letter will be sent.

## 2024-06-06 ENCOUNTER — TELEPHONE (OUTPATIENT)
Dept: TRANSPLANT | Facility: CLINIC | Age: 64
End: 2024-06-06
Payer: COMMERCIAL

## 2024-06-06 NOTE — TELEPHONE ENCOUNTER
Received return call from Dr. Eric's RN regarding anticoagulation plan for perioperative period. They would like to see the pt two weeks prior to his transplant surgery and will likely prescribe Lovenox for bridging. Will ensure pt sees Dr. Eric at the 2 week pre-transplant jay to confirm plan.

## 2024-06-12 ENCOUNTER — ANESTHESIA EVENT (OUTPATIENT)
Dept: GASTROENTEROLOGY | Facility: CLINIC | Age: 64
End: 2024-06-12
Payer: COMMERCIAL

## 2024-06-12 ENCOUNTER — PRE VISIT (OUTPATIENT)
Dept: SURGERY | Facility: CLINIC | Age: 64
End: 2024-06-12

## 2024-06-12 ENCOUNTER — VIRTUAL VISIT (OUTPATIENT)
Dept: SURGERY | Facility: CLINIC | Age: 64
End: 2024-06-12
Payer: COMMERCIAL

## 2024-06-12 VITALS — HEIGHT: 67 IN | WEIGHT: 253.53 LBS | BODY MASS INDEX: 39.79 KG/M2

## 2024-06-12 DIAGNOSIS — I85.00 ESOPHAGEAL VARICES WITHOUT BLEEDING, UNSPECIFIED ESOPHAGEAL VARICES TYPE (H): ICD-10-CM

## 2024-06-12 DIAGNOSIS — Z01.818 PRE-OP EVALUATION: Primary | ICD-10-CM

## 2024-06-12 PROCEDURE — 99214 OFFICE O/P EST MOD 30 MIN: CPT | Mod: 95 | Performed by: PHYSICIAN ASSISTANT

## 2024-06-12 ASSESSMENT — LIFESTYLE VARIABLES: TOBACCO_USE: 0

## 2024-06-12 ASSESSMENT — ENCOUNTER SYMPTOMS: DYSRHYTHMIAS: 0

## 2024-06-12 ASSESSMENT — PAIN SCALES - GENERAL: PAINLEVEL: NO PAIN (0)

## 2024-06-12 NOTE — PATIENT INSTRUCTIONS
Name:  Bao Keenan   MRN:  [id number]   :  1960   Today's Date:  2024     GI Lab procedures:    A representative from the GI Lab will contact you regarding arrival date and time.      You were seen today in the PAC Clinic.   (Pre-operative Anesthesia Assessment Center)  [facility] [place] [address]   phone [phone]    You had a pre-operative assessment done.    Anesthesia recommendations for medications:    Hold Aspirin for 2 days before procedure.  Hold Multivitamins for 7 days before procedure.   Hold Herbal medications for 7 days before procedure.  Hold Ibuprofen for 2 days before procedure.   Hold Naproxen for 2 days before procedure.     No alcohol or cannabis products for 24 hours before your procedure      Special instructions for anticoagulation medications:    HOLD ELIQUIS (APIXABAN) BEFORE SURGERY - hold 48 hours before surgery    Please hold the following medications the day of procedure:    B complex - C - Zn - Folic Acid, Phoslo.      Please take these medications the day of procedure:    Tylenol if needed; take crestor if scheduled in the morning.          For questions or appointments, call:  [facility]: [phone], option 2.  Monday through Friday, 8 a.m. to 4:30 p.m.  (If it is after hours, please reach out to the clinic or provider that scheduled your appointment)

## 2024-06-12 NOTE — PROGRESS NOTES
Marito is a 64 year old who is being evaluated via a billable video visit.    How would you like to obtain your AVS? MyChart  If the video visit is dropped, the invitation should be resent by: Text to cell phone: [phone]    Subjective   Marito is a 64 year old, presenting for the following health issues:  Pre-Op Exam    HPI           Physical Exam

## 2024-06-12 NOTE — H&P
Pre-Operative H & P     CC:  Preoperative exam to assess for increased cardiopulmonary risk while undergoing surgery and anesthesia.    Date of Encounter: 6/12/2024  Primary Care Physician:  Stephani Eric     Reason for visit:   Encounter Diagnoses   Name Primary?    Pre-op evaluation Yes    Esophageal varices without bleeding, unspecified esophageal varices type (H)        HPI  Bao Keenan is a 64 year old male who presents for pre-operative H & P in preparation for  Procedure Information       Case: [id number] Date/Time: 06/27/24 1315    Procedure: Esophagoscopy, gastroscopy, duodenoscopy (EGD), combined (Esophagus)    Anesthesia type: MAC    Diagnosis: Esophageal varices (H) [I85.00]    Pre-op diagnosis: Esophageal varices (H) [I85.00]    Location:  GI 03 /  GI    Providers: Guru Meseret Corrales MD            Patient is being evaluated for comorbid conditions of IgA nephropathy, ESRD on dialysis, HTN, GREG, hx of DVT, prediabetes, and obesity.     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 His course has been complicated by variceal bleeding (2014), splenomegaly, thrombocytopenia, and coagulopathy. He recently underwent an EGD and EUS for variceal screening and to measure protal pressures. Several varices were found and banded during the last procedure. He is now scheduled for repeat EGD as above for follow up.    Of note, patient is actively undergoing kidney transplant evaluation.     History is obtained from the patient and chart review    Hx of abnormal bleeding, anticoagulation, or anti-platelet use: Eliquis      Past Medical History  Past Medical History:   Diagnosis Date    Anemia     Antiplatelet or antithrombotic long-term use     Arthritis     Right Knee    Coagulation disorder (H24)     Protein C deficiency    Esophageal varices (H)     Gastro-oesophageal reflux disease     History of blood  transfusion     Hypertension     IgA nephropathy     Liver disease     non alcoholic fatty liver    GREG (obstructive sleep apnea)     Portal hypertension (H)     Renal disease     CKD    Thrombosis of leg     +DVT       Past Surgical History  Past Surgical History:   Procedure Laterality Date    BIOPSY      2014    COLONOSCOPY      ENDOSCOPIC ULTRASOUND UPPER GASTROINTESTINAL TRACT (GI) N/A 5/13/2024    Procedure: ENDOSCOPIC ULTRASOUND, ESOPHAGOSCOPY / UPPER GASTROINTESTINAL TRACT (GI);  Surgeon: Guru Meseret Corrales MD;  Location: [facility] OR    ENT SURGERY      tonsils    EYE SURGERY      lasik    IR IVC FILTER PLACEMENT      IR IVC FILTER REMOVAL  12/17/2014    TIPS PROCEDURE         Prior to Admission Medications  Current Outpatient Medications   Medication Sig Dispense Refill    B Complex-C-Zn-Folic Acid (DIALYVITE/ZINC) TABS Take 1 tablet by mouth every morning      calcium acetate (PHOSLO) 667 MG CAPS capsule Take 667 mg by mouth 3 times daily (with meals)      ELIQUIS ANTICOAGULANT 2.5 MG tablet Take 1 tablet by mouth 2 times daily      rosuvastatin (CRESTOR) 5 MG tablet Take 5 mg by mouth every morning         Allergies  No Known Allergies    Social History  Social History     Socioeconomic History    Marital status:      Spouse name: Not on file    Number of children: Not on file    Years of education: Not on file    Highest education level: Not on file   Occupational History    Not on file   Tobacco Use    Smoking status: Never    Smokeless tobacco: Never   Substance and Sexual Activity    Alcohol use: No    Drug use: No    Sexual activity: Not on file   Other Topics Concern    Parent/sibling w/ CABG, MI or angioplasty before 65F 55M? Not Asked   Social History Narrative    Not on file     Social Determinants of Health     Financial Resource Strain: High Risk (1/1/2022)    [facility] [employer] [facility] [employer] [facility]    Financial  Resource Strain     Difficulty of Paying Living Expenses: Not on file     Difficulty of Paying Living Expenses: Not on file   Food Insecurity: Not on file   Transportation Needs: Not on file   Physical Activity: Not on file   Stress: Not on file   Social Connections: Unknown (4/10/2023)    Received from [employer] [facility], [facility]    Social Connections     Frequency of Communication with Friends and Family: Not on file   Interpersonal Safety: Not on file   Housing Stability: Not on file       Family History  Family History   Problem Relation Age of Onset    Protein C deficiency Niece     Kidney Disease No family hx of     Anesthesia Reaction No family hx of     Bleeding Disorder No family hx of        Review of Systems  The complete review of systems is negative other than noted in the HPI or here.   Anesthesia Evaluation   Pt has had prior anesthetic. Type: General and MAC.    No history of anesthetic complications       ROS/MED HX  ENT/Pulmonary:     (+) sleep apnea, uses CPAP,                                   (-) tobacco use, asthma, GREG risk factors and recent URI   Neurologic:  - neg neurologic ROS     Cardiovascular:     (+) Dyslipidemia hypertension-range: 115/60/ -   -  - -   Taking blood thinners Pt has not received instructions: Instructions Given to patient: Instructions from GI-2 day hold of Eliquis, starting 5/11/24.                            Previous cardiac testing   Echo: Date: 2/8/24 Results:  Interpretation Summary  Technically difficult study.     Global and regional left ventricular function is normal with an EF of 55-60%.  The right ventricle is normal in size and function.  No significant valvular abnormalities present.  No pericardial effusion is present.  IVC diameter <2.1 cm collapsing >50% with sniff suggests a normal RA pressure  of 3 mmHg.  This study was compared with the study from 9/22/2014 with no significant  changes  noted.       Stress Test:  Date: 3/5/24 Results:  Interpretation Summary  Near maximal stress test, achieved 84% of the age predicted maximal heart  rate. Test stopped due to fatigue.     Normal blood pressure response to exercise.  No angina symptoms with exercise.  No ECG evidence of ischemia.  Normal global LV function with EF of approximately 55-60% at rest.  With exercise, LVEF increases to 60-65% and left ventricular cavity size  decreases appropriately.  No rest or stress-induced regional wall motion abnormalities.  Average functional capacity for age.     No significant valvular dysfunction noted on screening 2D and Doppler  examination.    ECG Reviewed:  Date: 2/8/24 Results:  Sinus rhythm with 1st degree A-V block   Incomplete right bundle branch block   Borderline ECG   When compared with ECG of 05-SEP-2014 12:11,   PA interval has increased   Vent. rate has increased BY  32 BPM   QT has lengthened     Cath:  Date: Results:   (-) MOELLER and arrhythmias   METS/Exercise Tolerance: >4 METS    Hematologic: Comments: Protein C deficiency    (+) History of blood clots,    pt is anticoagulated, anemia, history of blood transfusion, no previous transfusion reaction,        Musculoskeletal:   (+)  arthritis,             GI/Hepatic:     (+) GERD,  esophageal disease, Varices,         liver disease (NAFLD, Portal HTN),       Renal/Genitourinary: Comment: IgA nephropathy    (+) renal disease, type: CRI, Pt does not require dialysis,           Endo:     (+)               Obesity,    (-) Type II DM and thyroid disease   Psychiatric/Substance Use:  - neg psychiatric ROS     Infectious Disease:  - neg infectious disease ROS     Malignancy:  - neg malignancy ROS     Other:  - neg other ROS          Virtual visit -  No vitals were obtained    Physical Exam  Constitutional: Pleasant male, no apparent distress, and appears stated age.  Eyes: Pupils equal  HENT: Normocephalic and atraumatic  Respiratory: Non labored breathing on  room air  Neurologic: Awake, alert, oriented to name, place and time.   Neuropsychiatric: Calm, cooperative. Normal affect.       Prior Labs/Diagnostic Studies   All labs and imaging personally reviewed     EKG/ stress test - if available please see in ROS above   Echo result w/o MOPS: Interpretation SummaryTechnically difficult study. Global and regional left ventricular function is normal with an EF of 55-60%.The right ventricle is normal in size and function.No significant valvular abnormalities present.No pericardial effusion is present.IVC diameter <2.1 cm collapsing >50% with sniff suggests a normal RA pressureof 3 mmHg.This study was compared with the study from 9/22/2014 with no significantchanges noted.     The patient's records and results personally reviewed by this provider.     Outside records reviewed from: Care Everywhere      Assessment  Bao Keenan is a 64 year old male seen as a PAC referral for risk assessment and optimization for anesthesia.    Plan/Recommendations  Pt will be optimized for the proposed procedure.  See below for details on the assessment, risk, and preoperative recommendations    NEUROLOGY  - No history of TIA, CVA or seizure    -Post Op delirium risk factors:  No risk identified    ENT  - No current airway concerns.  Will need to be reassessed day of surgery.  Mallampati: Unable to assess  TM: Unable to assess    CARDIAC  - No history of CAD and Afib  - Hypertension  Hx of HTN. Not currently on medications.  - Hyperlipidemia  Well controlled on home regimen  - Completed cardiac preop evaluation in preparation for future kidney transplant. Results above.     - METS (Metabolic Equivalents)  Patient performs 4 or more METS exercise without symptoms             Total Score: 0      RCRI-Low risk: Class 2 0.9% complication rate             Total Score: 1    RCRI: Elevated Creatinine        PULMONARY  - Obstructive Sleep Apnea  GREG with home CPAP.  Patient will be instructed to  "bring their home CPAP device to the hospital with them.    - Denies asthma or inhaler use  - Tobacco History    History   Smoking Status    Never   Smokeless Tobacco    Never       GI  - GERD  History of. Not currently on medications.   - Non-cirrhotic portal hypertension, Esophageal varices  Above procedure planned for further management    PONV Low Risk  Total Score: 1           1 AN PONV: Patient is not a current smoker        /RENAL  - ESRD on dialysis (M/W/F). LUE AVF. Baseline Creatinine  4.90  - IgA Nephropathy  - Working toward kidney transplant    ENDOCRINE    - BMI: Estimated body mass index is 39.71 kg/m  as calculated from the following:    Height as of this encounter: 1.702 m (5' 7\").    Weight as of this encounter: 115 kg (253 lb 8.5 oz).  Obesity (BMI >30)  - No history of Diabetes Mellitus    HEME  VTE Medium Risk 1.8%             Total Score: 7    VTE: Greater than 59 yrs old    VTE: BMI greater than 39    VTE: Male    VTE: Pt history of VTE      - History of DVT  and protein C deficiency  - Coagulopathy secondary to Apixaban (Eliquis). Hold 2 days prior to surgery.   - Chronic anemia  Hgb 12.1 on 6/3/24  Recommend perioperative use of blood conservation techniques intraoperatively and close monitoring for postoperative bleeding.  - History of blood transfusions  No known transfusion reaction or antibodies    MSK  - Osteoarthritis    Different anesthesia methods/types have been discussed with the patient, but they are aware that the final plan will be decided by the assigned anesthesia provider on the date of service.    The patient is optimized for their procedure. AVS with information on surgery time/arrival time, meds and NPO status given by nursing staff. No further diagnostic testing indicated.    Please refer to the physical examination documented by the anesthesiologist in the anesthesia record on the day of surgery.    Video-Visit Details    Type of service:  Video Visit    Provider " received verbal consent for a Video Visit from the patient? Yes     Originating Location (pt. Location): Work    Distant Location (provider location):  Off-site  Mode of Communication:  Video Conference via [employer]  On the day of service:     Prep time: 20 minutes  Visit time: 8 minutes  Documentation time: 8 minutes  ------------------------------------------  Total time: 36 minutes      Quyen Rivera PA-C  Preoperative Assessment Center  [facility]  Clinic and Surgery Center  Phone: [phone]  Fax: [phone]

## 2024-06-14 ENCOUNTER — TELEPHONE (OUTPATIENT)
Dept: GASTROENTEROLOGY | Facility: CLINIC | Age: 64
End: 2024-06-14
Payer: COMMERCIAL

## 2024-06-14 NOTE — TELEPHONE ENCOUNTER
Pre assessment completed for upcoming procedure.   (Please see previous telephone encounter notes for complete details)      Procedure details:    Arrival time and facility location reviewed.    Pre op exam needed? Yes. PAC eval 6/12    Designated  policy reviewed. Instructed to have someone stay 24  hours post procedure.       Medication review:    Medications reviewed. Please see supporting documentation below. Holding recommendations discussed (if applicable).   Blood thinner/Anti-platelet medication(s):  Apixaban (Eliquis): Recommended HOLD 2 days before procedure.  Consult with your managing provider.      Prep for procedure:     Procedure prep instructions reviewed.        Any additional information needed:  N/A      Patient  verbalized understanding and had no questions or concerns at this time.      Carol Ann Velasquez RN  Endoscopy Procedure Pre Assessment   [phone] option 4

## 2024-06-14 NOTE — TELEPHONE ENCOUNTER
Pre visit planning completed.      Procedure details:    Patient scheduled for Upper endoscopy (EGD) on 6/27/2024.     Arrival time: 1145. Procedure time 1315    Facility location: [facility]; [address] [place] [address], [address] Floor, [place] [address]. Check in location: Main entrance at registration desk.    Sedation type: MAC    Pre op exam needed? Yes. PAC eval 6/12/2024    Indication for procedure: Esophageal variceal surveillance       Chart review:     Electronic implanted devices? No    Recent diagnosis of diverticulitis within the last 6 weeks? N/A    Diabetic? Prediabetic.       Medication review:    Anticoagulants? Yes Apixaban (Eliquis): Recommended HOLD 2 days before procedure.  Consult with your managing provider.    NSAIDS? No    Other medication HOLDING recommendations:  N/A      Prep for procedure:     Prep instructions sent via [employer]endorse         Carol Ann Velasquez RN  Endoscopy Procedure Pre Assessment RN  [phone] option 4

## 2024-06-17 LAB — UPPER EUS: NORMAL

## 2024-06-27 ENCOUNTER — HOSPITAL ENCOUNTER (OUTPATIENT)
Facility: CLINIC | Age: 64
Discharge: HOME | End: 2024-06-27
Attending: INTERNAL MEDICINE | Admitting: INTERNAL MEDICINE
Payer: COMMERCIAL

## 2024-06-27 ENCOUNTER — ANESTHESIA (OUTPATIENT)
Dept: GASTROENTEROLOGY | Facility: CLINIC | Age: 64
End: 2024-06-27
Payer: COMMERCIAL

## 2024-06-27 VITALS
SYSTOLIC BLOOD PRESSURE: 100 MMHG | HEART RATE: 79 BPM | RESPIRATION RATE: 16 BRPM | DIASTOLIC BLOOD PRESSURE: 58 MMHG | WEIGHT: 252 LBS | OXYGEN SATURATION: 98 % | BODY MASS INDEX: 39.47 KG/M2

## 2024-06-27 LAB — UPPER GI ENDOSCOPY: NORMAL

## 2024-06-27 PROCEDURE — 88305 TISSUE EXAM BY PATHOLOGIST: CPT | Mod: TC | Performed by: INTERNAL MEDICINE

## 2024-06-27 PROCEDURE — 43239 EGD BIOPSY SINGLE/MULTIPLE: CPT | Performed by: INTERNAL MEDICINE

## 2024-06-27 PROCEDURE — 43239 EGD BIOPSY SINGLE/MULTIPLE: CPT | Performed by: NURSE ANESTHETIST, CERTIFIED REGISTERED

## 2024-06-27 PROCEDURE — 250N000011 HC RX IP 250 OP 636: Performed by: NURSE ANESTHETIST, CERTIFIED REGISTERED

## 2024-06-27 PROCEDURE — 88305 TISSUE EXAM BY PATHOLOGIST: CPT | Mod: 26 | Performed by: STUDENT IN AN ORGANIZED HEALTH CARE EDUCATION/TRAINING PROGRAM

## 2024-06-27 PROCEDURE — 370N000017 HC ANESTHESIA TECHNICAL FEE, PER MIN: Performed by: INTERNAL MEDICINE

## 2024-06-27 PROCEDURE — 43239 EGD BIOPSY SINGLE/MULTIPLE: CPT | Performed by: ANESTHESIOLOGY

## 2024-06-27 PROCEDURE — 258N000003 HC RX IP 258 OP 636: Performed by: NURSE ANESTHETIST, CERTIFIED REGISTERED

## 2024-06-27 PROCEDURE — 250N000009 HC RX 250: Performed by: NURSE ANESTHETIST, CERTIFIED REGISTERED

## 2024-06-27 RX ORDER — LABETALOL HYDROCHLORIDE 5 MG/ML
10 INJECTION, SOLUTION INTRAVENOUS
Status: DISCONTINUED | OUTPATIENT
Start: 2024-06-27 | End: 2024-06-27 | Stop reason: HOSPADM

## 2024-06-27 RX ORDER — HYDROMORPHONE HCL IN WATER/PF 6 MG/30 ML
0.4 PATIENT CONTROLLED ANALGESIA SYRINGE INTRAVENOUS EVERY 5 MIN PRN
Status: DISCONTINUED | OUTPATIENT
Start: 2024-06-27 | End: 2024-06-27 | Stop reason: HOSPADM

## 2024-06-27 RX ORDER — NALOXONE HYDROCHLORIDE 0.4 MG/ML
0.1 INJECTION, SOLUTION INTRAMUSCULAR; INTRAVENOUS; SUBCUTANEOUS
Status: DISCONTINUED | OUTPATIENT
Start: 2024-06-27 | End: 2024-06-27 | Stop reason: HOSPADM

## 2024-06-27 RX ORDER — LIDOCAINE 40 MG/G
CREAM TOPICAL
Status: DISCONTINUED | OUTPATIENT
Start: 2024-06-27 | End: 2024-06-27 | Stop reason: HOSPADM

## 2024-06-27 RX ORDER — PROPOFOL 10 MG/ML
INJECTION, EMULSION INTRAVENOUS PRN
Status: DISCONTINUED | OUTPATIENT
Start: 2024-06-27 | End: 2024-06-27

## 2024-06-27 RX ORDER — SODIUM CHLORIDE, SODIUM LACTATE, POTASSIUM CHLORIDE, CALCIUM CHLORIDE 600; 310; 30; 20 MG/100ML; MG/100ML; MG/100ML; MG/100ML
INJECTION, SOLUTION INTRAVENOUS CONTINUOUS
Status: DISCONTINUED | OUTPATIENT
Start: 2024-06-27 | End: 2024-06-27 | Stop reason: HOSPADM

## 2024-06-27 RX ORDER — SODIUM CHLORIDE, SODIUM LACTATE, POTASSIUM CHLORIDE, CALCIUM CHLORIDE 600; 310; 30; 20 MG/100ML; MG/100ML; MG/100ML; MG/100ML
INJECTION, SOLUTION INTRAVENOUS CONTINUOUS PRN
Status: DISCONTINUED | OUTPATIENT
Start: 2024-06-27 | End: 2024-06-27

## 2024-06-27 RX ORDER — HYDROMORPHONE HCL IN WATER/PF 6 MG/30 ML
0.2 PATIENT CONTROLLED ANALGESIA SYRINGE INTRAVENOUS EVERY 5 MIN PRN
Status: DISCONTINUED | OUTPATIENT
Start: 2024-06-27 | End: 2024-06-27 | Stop reason: HOSPADM

## 2024-06-27 RX ORDER — ONDANSETRON 2 MG/ML
4 INJECTION INTRAMUSCULAR; INTRAVENOUS EVERY 30 MIN PRN
Status: DISCONTINUED | OUTPATIENT
Start: 2024-06-27 | End: 2024-06-27 | Stop reason: HOSPADM

## 2024-06-27 RX ORDER — ONDANSETRON 4 MG/1
4 TABLET, ORALLY DISINTEGRATING ORAL EVERY 30 MIN PRN
Status: DISCONTINUED | OUTPATIENT
Start: 2024-06-27 | End: 2024-06-27 | Stop reason: HOSPADM

## 2024-06-27 RX ORDER — HALOPERIDOL 5 MG/ML
1 INJECTION INTRAMUSCULAR
Status: DISCONTINUED | OUTPATIENT
Start: 2024-06-27 | End: 2024-06-27 | Stop reason: HOSPADM

## 2024-06-27 RX ORDER — OXYCODONE HYDROCHLORIDE 10 MG/1
10 TABLET ORAL
Status: DISCONTINUED | OUTPATIENT
Start: 2024-06-27 | End: 2024-06-27 | Stop reason: HOSPADM

## 2024-06-27 RX ORDER — ONDANSETRON 2 MG/ML
4 INJECTION INTRAMUSCULAR; INTRAVENOUS
Status: DISCONTINUED | OUTPATIENT
Start: 2024-06-27 | End: 2024-06-27 | Stop reason: HOSPADM

## 2024-06-27 RX ORDER — HYDRALAZINE HYDROCHLORIDE 20 MG/ML
2.5-5 INJECTION INTRAMUSCULAR; INTRAVENOUS EVERY 10 MIN PRN
Status: DISCONTINUED | OUTPATIENT
Start: 2024-06-27 | End: 2024-06-27 | Stop reason: HOSPADM

## 2024-06-27 RX ORDER — PROPOFOL 10 MG/ML
INJECTION, EMULSION INTRAVENOUS CONTINUOUS PRN
Status: DISCONTINUED | OUTPATIENT
Start: 2024-06-27 | End: 2024-06-27

## 2024-06-27 RX ORDER — LIDOCAINE HYDROCHLORIDE 20 MG/ML
INJECTION, SOLUTION INFILTRATION; PERINEURAL PRN
Status: DISCONTINUED | OUTPATIENT
Start: 2024-06-27 | End: 2024-06-27

## 2024-06-27 RX ORDER — OXYCODONE HYDROCHLORIDE 5 MG/1
5 TABLET ORAL
Status: DISCONTINUED | OUTPATIENT
Start: 2024-06-27 | End: 2024-06-27 | Stop reason: HOSPADM

## 2024-06-27 RX ADMIN — SODIUM CHLORIDE, POTASSIUM CHLORIDE, SODIUM LACTATE AND CALCIUM CHLORIDE: 600; 310; 30; 20 INJECTION, SOLUTION INTRAVENOUS at 13:40

## 2024-06-27 RX ADMIN — LIDOCAINE HYDROCHLORIDE 60 MG: 20 INJECTION, SOLUTION INFILTRATION; PERINEURAL at 13:44

## 2024-06-27 RX ADMIN — PROPOFOL 50 MG: 10 INJECTION, EMULSION INTRAVENOUS at 13:43

## 2024-06-27 RX ADMIN — PROPOFOL 200 MCG/KG/MIN: 10 INJECTION, EMULSION INTRAVENOUS at 13:48

## 2024-06-27 ASSESSMENT — ENCOUNTER SYMPTOMS: DYSRHYTHMIAS: 0

## 2024-06-27 ASSESSMENT — ACTIVITIES OF DAILY LIVING (ADL)
ADLS_ACUITY_SCORE: 35
ADLS_ACUITY_SCORE: 35

## 2024-06-27 ASSESSMENT — LIFESTYLE VARIABLES: TOBACCO_USE: 0

## 2024-06-27 NOTE — ANESTHESIA POSTPROCEDURE EVALUATION
Patient: Bao Keenan    Procedure: Procedure(s):  ESOPHAGOGASTRODUODENOSCOPY, WITH BIOPSY       Anesthesia Type:  MAC    Note:  Disposition: Outpatient   Postop Pain Control: Uneventful            Sign Out: Well controlled pain   PONV:    Neuro/Psych: Uneventful            Sign Out: Acceptable/Baseline neuro status   Airway/Respiratory: Uneventful            Sign Out: Acceptable/Baseline resp. status   CV/Hemodynamics: Uneventful            Sign Out: Acceptable CV status; No obvious hypovolemia; No obvious fluid overload   Other NRE: NONE   DID A NON-ROUTINE EVENT OCCUR? No           Last vitals:  Vitals Value Taken Time   /58 06/27/24 1435   Temp     Pulse 79 06/27/24 1435   Resp 16 06/27/24 1415   SpO2 98 % 06/27/24 1435       Electronically Signed By: Damien Dowd MD  June 27, 2024  5:53 PM

## 2024-06-27 NOTE — ANESTHESIA CARE TRANSFER NOTE
Patient: Bao Keenan    Procedure: Procedure(s):  ESOPHAGOGASTRODUODENOSCOPY, WITH BIOPSY       Diagnosis: Esophageal varices (H) [I85.00]  Diagnosis Additional Information: No value filed.    Anesthesia Type:   MAC     Note:    Oropharynx: oropharynx clear of all foreign objects  Level of Consciousness: drowsy  Oxygen Supplementation: face mask  Level of Supplemental Oxygen (L/min / FiO2): 6  Independent Airway: airway patency satisfactory and stable    Vital Signs Stable: post-procedure vital signs reviewed and stable  Report to RN Given: handoff report given  Patient transferred to: Phase II        Vitals:  Vitals Value Taken Time   /61    Temp     Pulse 74    Resp 10    SpO2 100%        Electronically Signed By: SARAY Triana CRNA  June 27, 2024  2:00 PM

## 2024-06-27 NOTE — ANESTHESIA PREPROCEDURE EVALUATION
Anesthesia Pre-Procedure Evaluation    Patient: Bao Keenan   MRN: [id number] : 1960        Procedure : Procedure(s):  Esophagoscopy, gastroscopy, duodenoscopy (EGD), combined          Past Medical History:   Diagnosis Date    Anemia     Antiplatelet or antithrombotic long-term use     Arthritis     Right Knee    Coagulation disorder (H24)     Protein C deficiency    Esophageal varices (H)     Gastro-oesophageal reflux disease     History of blood transfusion     Hypertension     IgA nephropathy     Liver disease     non alcoholic fatty liver    GREG (obstructive sleep apnea)     Portal hypertension (H)     Renal disease     CKD    Thrombosis of leg     +DVT      Past Surgical History:   Procedure Laterality Date    BIOPSY          COLONOSCOPY      ENDOSCOPIC ULTRASOUND UPPER GASTROINTESTINAL TRACT (GI) N/A 2024    Procedure: ENDOSCOPIC ULTRASOUND, ESOPHAGOSCOPY / UPPER GASTROINTESTINAL TRACT (GI);  Surgeon: Guru Meseret Corrales MD;  Location: [facility] OR    ENT SURGERY      tonsils    EYE SURGERY      lasik    IR IVC FILTER PLACEMENT      IR IVC FILTER REMOVAL  2014    TIPS PROCEDURE        No Known Allergies   Social History     Tobacco Use    Smoking status: Never    Smokeless tobacco: Never   Substance Use Topics    Alcohol use: No      Wt Readings from Last 1 Encounters:   24 115 kg (253 lb 8.5 oz)        Anesthesia Evaluation   Pt has had prior anesthetic. Type: General and MAC.    No history of anesthetic complications       ROS/MED HX  ENT/Pulmonary:     (+) sleep apnea, uses CPAP,                                   (-) tobacco use, asthma, GREG risk factors and recent URI   Neurologic:  - neg neurologic ROS     Cardiovascular:     (+) Dyslipidemia hypertension-range: 115/60/ -   -  - -   Taking blood thinners Pt has not received instructions: Instructions Given to patient: Instructions from GI-2 day hold of Eliquis, starting 24.                             Previous cardiac testing   Echo: Date: 2/8/24 Results:  Interpretation Summary  Technically difficult study.     Global and regional left ventricular function is normal with an EF of 55-60%.  The right ventricle is normal in size and function.  No significant valvular abnormalities present.  No pericardial effusion is present.  IVC diameter <2.1 cm collapsing >50% with sniff suggests a normal RA pressure  of 3 mmHg.  This study was compared with the study from 9/22/2014 with no significant  changes noted.       Stress Test:  Date: 3/5/24 Results:  Interpretation Summary  Near maximal stress test, achieved 84% of the age predicted maximal heart  rate. Test stopped due to fatigue.     Normal blood pressure response to exercise.  No angina symptoms with exercise.  No ECG evidence of ischemia.  Normal global LV function with EF of approximately 55-60% at rest.  With exercise, LVEF increases to 60-65% and left ventricular cavity size  decreases appropriately.  No rest or stress-induced regional wall motion abnormalities.  Average functional capacity for age.     No significant valvular dysfunction noted on screening 2D and Doppler  examination.    ECG Reviewed:  Date: 2/8/24 Results:  Sinus rhythm with 1st degree A-V block   Incomplete right bundle branch block   Borderline ECG   When compared with ECG of 05-SEP-2014 12:11,   TX interval has increased   Vent. rate has increased BY  32 BPM   QT has lengthened     Cath:  Date: Results:   (-) MOELLER and arrhythmias   METS/Exercise Tolerance: >4 METS    Hematologic: Comments: Protein C deficiency    (+) History of blood clots,    pt is anticoagulated, anemia, history of blood transfusion, no previous transfusion reaction,        Musculoskeletal:   (+)  arthritis,             GI/Hepatic:     (+) GERD,  esophageal disease, Varices,         liver disease (NAFLD, Portal HTN),       Renal/Genitourinary: Comment: IgA nephropathy    (+) renal disease, type: CRI, Pt does not require  "dialysis,           Endo:     (+)               Obesity,    (-) Type II DM and thyroid disease   Psychiatric/Substance Use:  - neg psychiatric ROS     Infectious Disease:  - neg infectious disease ROS     Malignancy:  - neg malignancy ROS     Other:  - neg other ROS          Physical Exam    Airway        Mallampati: II       Respiratory Devices and Support         Dental       (+) Minor Abnormalities - some fillings, tiny chips      Cardiovascular          Rhythm and rate: regular and normal     Pulmonary                   OUTSIDE LABS:  CBC:   Lab Results   Component Value Date    WBC 6.7 04/11/2024    WBC 5.1 02/08/2024    HGB 14.0 04/11/2024    HGB 13.8 02/08/2024    HCT 42.7 04/11/2024    HCT 39.8 (L) 02/08/2024     (L) 05/13/2024     (L) 04/11/2024     BMP:   Lab Results   Component Value Date     04/11/2024     02/08/2024    POTASSIUM 4.0 05/13/2024    POTASSIUM 5.3 04/11/2024    CHLORIDE 99 04/11/2024    CHLORIDE 96 (L) 02/08/2024    CO2 27 04/11/2024    CO2 26 02/08/2024    BUN 37.8 (H) 04/11/2024    BUN 38.7 (H) 02/08/2024    CR 3.85 (H) 05/13/2024    CR 4.90 (H) 04/11/2024     (H) 04/11/2024     (H) 02/08/2024     COAGS:   Lab Results   Component Value Date    PTT 38 02/08/2024    INR 1.30 (H) 04/11/2024     POC: No results found for: \"BGM\", \"HCG\", \"HCGS\"  HEPATIC:   Lab Results   Component Value Date    ALBUMIN 4.7 04/11/2024    PROTTOTAL 8.7 (H) 04/11/2024    ALT 17 04/11/2024    AST 33 04/11/2024    ALKPHOS 69 04/11/2024    BILITOTAL 0.8 04/11/2024     OTHER:   Lab Results   Component Value Date    A1C 6.0 (H) 02/08/2024    RAQUEL 10.1 04/11/2024       Anesthesia Plan    ASA Status:  4    NPO Status:  NPO Appropriate    Anesthesia Type: MAC.     - Reason for MAC: immobility needed   Induction: N/a.   Maintenance: N/A.        Consents    Anesthesia Plan(s) and associated risks, benefits, and realistic alternatives discussed. Questions answered and " "patient/representative(s) expressed understanding.     - Discussed:     - Discussed with:  Patient            Postoperative Care    Pain management: IV analgesics.   PONV prophylaxis: Dexamethasone or Solumedrol, Ondansetron (or other 5HT-3)     Comments:               Damien Dowd MD    I have reviewed the pertinent notes and labs in the chart from the past 30 days and (re)examined the patient.  Any updates or changes from those notes are reflected in this note.            # Drug Induced Coagulation Defect: home medication list includes an anticoagulant medication   # Obesity: Estimated body mass index is 39.71 kg/m  as calculated from the following:    Height as of 6/12/24: 1.702 m (5' 7\").    Weight as of 6/12/24: 115 kg (253 lb 8.5 oz).      "

## 2024-06-28 LAB
PATH REPORT.COMMENTS IMP SPEC: NORMAL
PATH REPORT.COMMENTS IMP SPEC: NORMAL
PATH REPORT.FINAL DX SPEC: NORMAL
PATH REPORT.GROSS SPEC: NORMAL
PATH REPORT.MICROSCOPIC SPEC OTHER STN: NORMAL
PATH REPORT.RELEVANT HX SPEC: NORMAL
PHOTO IMAGE: NORMAL

## 2024-07-01 ENCOUNTER — CARE COORDINATION (OUTPATIENT)
Dept: GASTROENTEROLOGY | Facility: CLINIC | Age: 64
End: 2024-07-01
Payer: COMMERCIAL

## 2024-07-01 NOTE — PROGRESS NOTES
Post EGD on 6/27/24 with Dr. Corrales.      Follow-up recommendations:   - Will repeat EGD in 1 year for variceal surveillance   - Can consider prilosec 20 mg once daily for gastritis and duodenitis   - Await pathology results. Will recommend triple therapy for H.pylori eradication     Following review of pathology, per Dr. Corrales: Mild gastritis was seen on biopsy. No H.pylori organisms were visualized     Communicated recommendations and biopsy results to patient via [facility]. Reminder to [facility] nursing pool for repeat EGD in June 2025 for variceal surveillance.      Ayesha Pete, RN Care Coordinator

## 2024-07-15 ENCOUNTER — LAB (OUTPATIENT)
Dept: LAB | Facility: CLINIC | Age: 64
End: 2024-07-15
Payer: COMMERCIAL

## 2024-07-15 DIAGNOSIS — Z76.82 AWAITING ORGAN TRANSPLANT: ICD-10-CM

## 2024-07-15 LAB
ATRIAL RATE - MUSE: 86 BPM
DIASTOLIC BLOOD PRESSURE - MUSE: NORMAL MMHG
INTERPRETATION ECG - MUSE: NORMAL
P AXIS - MUSE: 49 DEGREES
PR INTERVAL - MUSE: 236 MS
QRS DURATION - MUSE: 116 MS
QT - MUSE: 396 MS
QTC - MUSE: 473 MS
R AXIS - MUSE: -5 DEGREES
SYSTOLIC BLOOD PRESSURE - MUSE: NORMAL MMHG
T AXIS - MUSE: 4 DEGREES
VENTRICULAR RATE- MUSE: 86 BPM

## 2024-07-15 PROCEDURE — 86833 HLA CLASS II HIGH DEFIN QUAL: CPT

## 2024-07-15 PROCEDURE — 86832 HLA CLASS I HIGH DEFIN QUAL: CPT

## 2024-08-05 ENCOUNTER — OFFICE VISIT (OUTPATIENT)
Dept: GASTROENTEROLOGY | Facility: CLINIC | Age: 64
End: 2024-08-05
Payer: COMMERCIAL

## 2024-08-05 VITALS
DIASTOLIC BLOOD PRESSURE: 62 MMHG | BODY MASS INDEX: 39.9 KG/M2 | HEART RATE: 74 BPM | SYSTOLIC BLOOD PRESSURE: 106 MMHG | OXYGEN SATURATION: 100 % | HEIGHT: 67 IN | WEIGHT: 254.2 LBS

## 2024-08-05 DIAGNOSIS — I85.01 BLEEDING ESOPHAGEAL VARICES, UNSPECIFIED ESOPHAGEAL VARICES TYPE (H): Primary | ICD-10-CM

## 2024-08-05 DIAGNOSIS — K76.6 PORTAL HYPERTENSION (H): ICD-10-CM

## 2024-08-05 PROCEDURE — 99214 OFFICE O/P EST MOD 30 MIN: CPT | Performed by: STUDENT IN AN ORGANIZED HEALTH CARE EDUCATION/TRAINING PROGRAM

## 2024-08-05 NOTE — NURSING NOTE
"Chief Complaint   Patient presents with    Follow Up     Pre-transplant evaluation for kidney transplant     He requests these members of his care team be copied on today's visit information:  PCP:   Stephani Eric RN  [address] [place] [address]    Vitals:    08/05/24 1518   BP: 106/62   BP Location: Right arm   Patient Position: Sitting   Cuff Size: Adult Large   Pulse: 74   SpO2: 100%   Weight: 115.3 kg (254 lb 3.2 oz)   Height: 1.702 m (5' 7\")     Body mass index is 39.81 kg/m .    Medications were reconciled.        Anahy Kurtz CMA    "

## 2024-08-05 NOTE — LETTER
8/5/2024      Bao Keenan  [address] [facility] [address]      Dear Colleague,    Thank you for referring your patient, Bao Keenan, to the [facility]. Please see a copy of my visit note below.    [facility] Return Patient Visit    Date of Visit: August 5, 2024    Reason for referral: non-cirrhotic portal HTN    Subjective: Mr. 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 He presents for follow up of his portal HTN.     - 2005: DVT (right leg)   - 6/2009: Presented with abdominal pain and CT scan was noted for acute thrombus in the portal and splenic veins.  Hypercoagulable workup at that time demonstrated a low protein C.  Protein C and Antithrombin were normal.  Factor V Leiden and prothrombin mutation were normal/negative.  Lupus anticoagulant was negative on 2 occasions.  Cardiolipin antibody was negative.  - 4/15/2014: Admitted with hematochezia.  Upper endoscopy was notable for portal hypertension and bleeding varices that required banding.  CT scan at that time noted cavernous transformation of the portal vein with multiple collaterals and evidence of portal hypertension with splenomegaly and varices.  - 5/2014: Seen by a hematologist/oncologist and restarted on anticoagulation  - 2014: IVC filter placed (since removed 12/2014)  - 9/10/2014: partial embolization of the spleen + shunts and embolization of the varix emptying into the left renal vein with microcoils and Gelfoam  - 5/2022: admitted for hematemesis felt to be secondary to bleeding PHG. Also found to be H pylori and was reportedly treated with triple therapy.   - 1/11/2023: admission for FELICIANO on CKD and during the hospitalization had hematochezia with EGD/colonoscopy: EGD unremarkable and colonoscopy with one non-bleeding AVM s/p ablation and oozing internal hemorrhoids in the setting  of a supratheraputic INR    Interval Events:   -EGD and EUS completed.  EGD showed grade 2 varices that were banded.  EUS was completed, portal pressure measurements were unable to be completed due to vessels in the area likely collaterals.  He had a follow-up EGD that showed small varices.  -Otherwise has been doing well.  He was approved to be listed for kidney transplant, waiting for his son to complete his evaluation.  He is on Eliquis,to change to warfarin when he is not active on the list.  -He does not drink any alcohol    ROS: 14 point ROS negative except for positives noted in HPI.    PMHx:  Past Medical History:   Diagnosis Date     Anemia      Antiplatelet or antithrombotic long-term use      Arthritis     Right Knee     Coagulation disorder (H24)     Protein C deficiency     Esophageal varices (H)      Gastro-oesophageal reflux disease      History of blood transfusion      Hypertension      IgA nephropathy      Liver disease     non alcoholic fatty liver     GREG (obstructive sleep apnea)      Portal hypertension (H)      Renal disease     CKD     Thrombosis of leg     +DVT       PSHx:  Past Surgical History:   Procedure Laterality Date     BIOPSY      2014     COLONOSCOPY       ENDOSCOPIC ULTRASOUND UPPER GASTROINTESTINAL TRACT (GI) N/A 5/13/2024    Procedure: ENDOSCOPIC ULTRASOUND, ESOPHAGOSCOPY / UPPER GASTROINTESTINAL TRACT (GI);  Surgeon: Guru Meseret Corrales MD;  Location: [facility] OR     ENT SURGERY      tonsils     ESOPHAGOSCOPY, GASTROSCOPY, DUODENOSCOPY (EGD), COMBINED N/A 6/27/2024    Procedure: ESOPHAGOGASTRODUODENOSCOPY, WITH BIOPSY;  Surgeon: Guru Meseret Corrales MD;  Location: [facility] GI     EYE SURGERY      lasik     IR IVC FILTER PLACEMENT       IR IVC FILTER REMOVAL  12/17/2014     TIPS PROCEDURE         FamHx:  Family History   Problem Relation Age of Onset     Protein C deficiency Niece      Kidney Disease No family hx of      Anesthesia Reaction No family hx of       Bleeding Disorder No family hx of      No family history of liver disease, liver cancer    SocHx:  Social History     Socioeconomic History     Marital status:      Spouse name: Not on file     Number of children: Not on file     Years of education: Not on file     Highest education level: Not on file   Occupational History     Not on file   Tobacco Use     Smoking status: Never     Smokeless tobacco: Never   Vaping Use     Vaping status: Never Used   Substance and Sexual Activity     Alcohol use: No     Drug use: No     Sexual activity: Not on file   Other Topics Concern     Parent/sibling w/ CABG, MI or angioplasty before 65F 55M? Not Asked   Social History Narrative     Not on file     Social Determinants of Health     Financial Resource Strain: High Risk (1/1/2022)    [facility]    Financial Resource Strain      Difficulty of Paying Living Expenses: Not on file      Difficulty of Paying Living Expenses: Not on file   Food Insecurity: Not on file   Transportation Needs: Not on file   Physical Activity: Not on file   Stress: Not on file   Social Connections: Unknown (4/10/2023)    [facility]    Social Connections      Frequency of Communication with Friends and Family: Not on file   Interpersonal Safety: Not on file   Housing Stability: Not on file       Medications:  Current Outpatient Medications   Medication Sig Dispense Refill     B Complex-C-Zn-Folic Acid (DIALYVITE/ZINC) TABS Take 1 tablet by mouth every morning       calcium acetate (PHOSLO) 667 MG CAPS capsule Take 667 mg by mouth 3 times daily (with meals)       ELIQUIS ANTICOAGULANT 2.5 MG tablet Take 1 tablet by mouth 2 times daily       rosuvastatin (CRESTOR) 5 MG tablet Take 5 mg by mouth every morning       No current facility-administered medications for  "this visit.     No OTCs, herbals    Allergies:  No Known Allergies    Objective:  /62 (BP Location: Right arm, Patient Position: Sitting, Cuff Size: Adult Large)   Pulse 74   Ht 1.702 m (5' 7\")   Wt 115.3 kg (254 lb 3.2 oz)   SpO2 100%   BMI 39.81 kg/m    Constitutional: pleasant man in NAD  Eyes: non icteric  Respiratory: Normal respiratory excursion   MSK: normal range of motion of visualized extremities  Abd: Non distended  Skin: No jaundice  Psychiatric: normal mood and orientation    Labs:  Last Comprehensive Metabolic Panel:  Sodium   Date Value Ref Range Status   04/11/2024 141 135 - 145 mmol/L Final     Comment:     Reference intervals for this test were updated on 09/26/2023 to more accurately reflect our healthy population. There may be differences in the flagging of prior results with similar values performed with this method. Interpretation of those prior results can be made in the context of the updated reference intervals.    09/11/2014 140 133 - 144 mmol/L Final     Potassium   Date Value Ref Range Status   05/13/2024 4.0 3.4 - 5.3 mmol/L Final   09/11/2014 4.2 3.4 - 5.3 mmol/L Final     Chloride   Date Value Ref Range Status   04/11/2024 99 98 - 107 mmol/L Final   09/11/2014 108 94 - 109 mmol/L Final     Carbon Dioxide   Date Value Ref Range Status   09/11/2014 23 20 - 32 mmol/L Final     Carbon Dioxide (CO2)   Date Value Ref Range Status   04/11/2024 27 22 - 29 mmol/L Final     Anion Gap   Date Value Ref Range Status   04/11/2024 15 7 - 15 mmol/L Final   09/11/2014 9 6 - 17 mmol/L Final     Glucose   Date Value Ref Range Status   04/11/2024 103 (H) 70 - 99 mg/dL Final   09/11/2014 147 (H) 70 - 99 mg/dL Final     Comment:     Effective 7/30/2014, the reference range for this assay has changed to reflect   new instrumentation/methodology.       Urea Nitrogen   Date Value Ref Range Status   04/11/2024 37.8 (H) 8.0 - 23.0 mg/dL Final   09/11/2014 18 7 - 30 mg/dL Final     Comment:     " Effective 7/30/2014, the reference range for this assay has changed to reflect   new instrumentation/methodology.       Creatinine   Date Value Ref Range Status   05/13/2024 3.85 (H) 0.67 - 1.17 mg/dL Final   09/11/2014 1.34 (H) 0.66 - 1.25 mg/dL Final     GFR Estimate   Date Value Ref Range Status   05/13/2024 17 (L) >60 mL/min/1.73m2 Final   09/11/2014 56 (L) >60 mL/min/1.7m2 Final     Comment:     Non  GFR Calc     Calcium   Date Value Ref Range Status   04/11/2024 10.1 8.8 - 10.2 mg/dL Final   09/11/2014 9.1 8.5 - 10.1 mg/dL Final     Comment:     Effective 7/30/2014, the reference range for this assay has changed to reflect   new instrumentation/methodology.       Bilirubin Total   Date Value Ref Range Status   04/11/2024 0.8 <=1.2 mg/dL Final   09/11/2014 0.5 0.2 - 1.3 mg/dL Final     Alkaline Phosphatase   Date Value Ref Range Status   04/11/2024 69 40 - 150 U/L Final     Comment:     Reference intervals for this test were updated on 11/14/2023 to more accurately reflect our healthy population. There may be differences in the flagging of prior results with similar values performed with this method. Interpretation of those prior results can be made in the context of the updated reference intervals.   09/11/2014 43 40 - 150 U/L Final     ALT   Date Value Ref Range Status   04/11/2024 17 0 - 70 U/L Final     Comment:     Reference intervals for this test were updated on 6/12/2023 to more accurately reflect our healthy population. There may be differences in the flagging of prior results with similar values performed with this method. Interpretation of those prior results can be made in the context of the updated reference intervals.     09/11/2014 35 0 - 70 U/L Final     AST   Date Value Ref Range Status   04/11/2024 33 0 - 45 U/L Final     Comment:     Reference intervals for this test were updated on 6/12/2023 to more accurately reflect our healthy population. There may be differences in the  flagging of prior results with similar values performed with this method. Interpretation of those prior results can be made in the context of the updated reference intervals.   09/11/2014 47 (H) 0 - 45 U/L Final       Lab Results   Component Value Date    WBC 6.7 04/11/2024    WBC 6.0 10/07/2014     Lab Results   Component Value Date    RBC 4.54 04/11/2024    RBC 4.14 10/07/2014     Lab Results   Component Value Date    HGB 14.0 04/11/2024    HGB 11.8 10/07/2014     Lab Results   Component Value Date    HCT 42.7 04/11/2024    HCT 36.2 10/07/2014     Lab Results   Component Value Date    MCV 94 04/11/2024    MCV 87 10/07/2014     Lab Results   Component Value Date    MCH 30.8 04/11/2024    MCH 28.5 10/07/2014     Lab Results   Component Value Date    MCHC 32.8 04/11/2024    MCHC 32.6 10/07/2014     Lab Results   Component Value Date    RDW 13.3 04/11/2024    RDW 16.7 10/07/2014     Lab Results   Component Value Date     05/13/2024     10/07/2014       INR   Date Value Ref Range Status   04/11/2024 1.30 (H) 0.85 - 1.15 Final   09/10/2014 1.06 0.86 - 1.14 Final        MELD 3.0: 21 at 4/11/2024 11:54 AM  MELD-Na: 23 at 4/11/2024 11:54 AM  Calculated from:  Serum Creatinine: 4.90 mg/dL (Using max of 3 mg/dL) at 4/11/2024 11:54 AM  Serum Sodium: 141 mmol/L (Using max of 137 mmol/L) at 4/11/2024 11:54 AM  Total Bilirubin: 0.8 mg/dL (Using min of 1 mg/dL) at 4/11/2024 11:54 AM  Serum Albumin: 4.7 g/dL (Using max of 3.5 g/dL) at 4/11/2024 11:54 AM  INR(ratio): 1.30 at 4/11/2024 11:54 AM  Age at listing (hypothetical): 63 years  Sex: Male at 4/11/2024 11:54 AM      Previous work-up:   Lab Results   Component Value Date    HEPBANG Nonreactive 02/08/2024    HBCAB Nonreactive 02/08/2024    AUSAB >1,000.00 02/08/2024    HCVAB Nonreactive 02/08/2024    CHOL 129 04/11/2024    HDL 47 04/11/2024    LDL 58 04/11/2024    TRIG 118 04/11/2024    A1C 6.0 (H) 02/08/2024      Lab Results   Component Value Date    SPECDES   02/08/2024     Blood: ACD      LDRESULTS  02/08/2024       Factor V 1691G>A (Leiden)  RESULTS:  Mutation analyzed: 1691G>A  Factor V 1691G>A (Leiden)  Interpretation:  ABSENT  Factor V 1691G>A (Leiden) mutation  genotype:  G/G    FACTOR 2/PROTHROMBIN RESULTS:  Mutation analyzed: [id number]>A  Factor 2 Mutation Interpretation:  ABSENT  Factor 2 Mutation genotype:  G/G           Imaging:        Radiology  CT Abdomen Pelvis w/o Contrast 2/2024  1.  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 Slight decrease in splenomegaly with multifocal areas of  infarcts again noted.  2.  Kidneys are slightly atrophic with bilateral cysts suspected.  3.  Stable presumed intraparenchymal lymph node along the right major  fissure.  4.  Bilateral fat-containing inguinal hernias with extension of the  anterior bladder wall towards the left hernia.    CT Abdomen Pelvis w/Contrast 10/2014  1. Splenorenal embolization changes are again seen. Multiple  associated splenic infarctions.  2. Slight decrease in splenomegaly. Multiple portal-systemic  collateral vessels are again seen.  3. Unchanged 0.8 cm pleural-based nodule along the right major  fissure. Continued followup to document two-year stability is  recommended.  4. Unchanged mild herniation of the urinary bladder the left inguinal  canal.    CT Abdomen Pelvis w/Contrast 7/2014  1. Innumerable perisplenic, gastric, and gastrorenal varicose veins.  Multiple varicose veins in gastric fundus varices with scattered  distal esophageal varices.  2. 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  3. Splenomegaly.  4. Infrarenal IVC filter.  5. A 6 cm nodule in the right lower. Recommend followup with low dose  chest CT in 12 months as per Fleischner Society criteria.    Endoscopy:    EGD 6/2024    Findings:       Grade I varices were found in the lower third of the  esophagus.        Localized moderately erythematous mucosa without bleeding was found in        the gastric antrum. Biopsies were taken with a cold forceps for        Helicobacter pylori testing.        Localized mildly erythematous mucosa without active bleeding and with no        stigmata of bleeding was found in the duodenal bulb.                                                                                    Impression:            - Grade I esophageal varices.                          - Erythematous mucosa in the antrum. Biopsied.                          - Normal examined duodenum.       EGD 5/2024    Findings:       Grade II varices were found in the lower third of the esophagus. Six        bands were successfully placed with complete eradication, resulting in        deflation of varices. There was no bleeding during and at the end of the        procedure.        The entire examined stomach was normal.        The examined duodenum was normal.        ENDOSONOGRAPHIC FINDING: :        The major papilla was endoscopically and sonographically normal. The        common bile duct was followed from the major papilla to the liver and no        stones were seen. The duct was non-dilated. The gallbladder was        sonographically normal without stones or sludge. There was no        gallbladder wall thickening or pericholecystic fluid. There was no        intrahepatic biliary dilation        The pancreatic parenchyma appeared unremarkable. There were no features        of chronic pancreatitis. No masses, cysts or stones were visualized in        the pancreatic parenchyma. The main pancreatic duct was followed from        the head to the body, excluding pancreas divisum. The pancreatic duct        measured 1.4 mm in the body of the pancreas.        Endosonographic imaging in the visualized portion of the liver showed no        intrahepatic ductal dilation, lesion or mass. The portal vein was        visualized, the  hepatic vein/IVC had some vessels intervening and after        discussion with Dr Tomlinson, it was decided to hold off EUS PPG measurements                                                                                    Impression:            - Grade II esophageal varices. Completely eradicated.                          Banded.                          - Normal stomach.                          - Normal examined duodenum.                          - EUS PPG measurements were not possible because of                          intervening vessls precluding HV or IVC access                          - Pancreas, bile duct and GB were otherwise                          unremarkable     EGD 5/27/2022  - Normal esophagus.        - Bleeding gastropathy, suspect portal HTN gastropathy.        - Normal examined duodenum.     EGD 12/31/2020  - Normal esophagus.        - healed , clips present        - Normal examined duodenum.     EGD 10/15/2020  - Non-bleeding diminutive esophageal varices.        - Oozing gastric ulcer with oozing hemorrhage (Sundeep Class Ib).        Injected. Clips were placed.        - Duodenitis.        - No specimens collected.       Independently reviewed labs and imaging.     Assessment/Plan: Mr. 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     He has a history of bleeding varices status post banding and partial splenic embolization over 10 years ago.  In the interim he had a EGD that showed grade 2 varices that were banded.  Follow-up EGD showed small varices.  His beta-blocker was stopped due to low blood pressures during dialysis.  He has not developed any ascites or other complications of portal hypertension after having this clot for 10 years.  His most recent ultrasound with Doppler showed a small splenic vein, portal vein was patent but it is possible this  was a collateral as he has had cavernous transformation on previous more dedicated abdominal imaging.  Unless the transplant team required more updated imaging of his vasculature or there was clinical progression of portal HTN, there is not a strong reason to get a CT or MRI.  Even if he were to have progression of his thrombosis, it is not clear we would have any changes in his management.  He needs to be on lifelong anticoagulation.  It would be very difficult to recanalize this chronic clot, it is not guaranteed to be successful and would come with significant risks.  In terms of risk portal HTN decompensation post kidney transplant, his case is unique and that he has the PVT without signs of liver disease. He appears to have intact liver synthetic function.  He has never had a biopsy, but his liver has been normal on imaging including no steatosis or concerns for fibrosis.  His albumin is normal indicating normal synthetic function.  Given the absence of intrinsic liver disease he is less likely to have typical liver decompensation after an abdominal surgery.  His case was reviewed at the kidney transplant team and given his intact hepatic function his PVT is not a barrier to transplant.  Discussed that increased collaterals in the abdomen may make him more likely to have bleeding complications.    -  repeat EGD 6/2025  -  consider repeat US 4/2025    Orders Placed This Encounter   Procedures     Adult GI  Referral - Procedure Only       RTC 12 months.      Carol Ann Taylor MD MS  Hepatology/Liver Transplant  [facility]      Again, thank you for allowing me to participate in the care of your patient.        Sincerely,        Carol Ann Taylor MD

## 2024-08-05 NOTE — PROGRESS NOTES
[facility] Return Patient Visit    Date of Visit: August 5, 2024    Reason for referral: non-cirrhotic portal HTN    Subjective: Mr. 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 He presents for follow up of his portal HTN.     - 2005: DVT (right leg)   - 6/2009: Presented with abdominal pain and CT scan was noted for acute thrombus in the portal and splenic veins.  Hypercoagulable workup at that time demonstrated a low protein C.  Protein C and Antithrombin were normal.  Factor V Leiden and prothrombin mutation were normal/negative.  Lupus anticoagulant was negative on 2 occasions.  Cardiolipin antibody was negative.  - 4/15/2014: Admitted with hematochezia.  Upper endoscopy was notable for portal hypertension and bleeding varices that required banding.  CT scan at that time noted cavernous transformation of the portal vein with multiple collaterals and evidence of portal hypertension with splenomegaly and varices.  - 5/2014: Seen by a hematologist/oncologist and restarted on anticoagulation  - 2014: IVC filter placed (since removed 12/2014)  - 9/10/2014: partial embolization of the spleen + shunts and embolization of the varix emptying into the left renal vein with microcoils and Gelfoam  - 5/2022: admitted for hematemesis felt to be secondary to bleeding PHG. Also found to be H pylori and was reportedly treated with triple therapy.   - 1/11/2023: admission for FELICIANO on CKD and during the hospitalization had hematochezia with EGD/colonoscopy: EGD unremarkable and colonoscopy with one non-bleeding AVM s/p ablation and oozing internal hemorrhoids in the setting of a supratheraputic INR    Interval Events:   -EGD and EUS completed.  EGD showed grade 2 varices that were banded.  EUS was completed, portal pressure measurements were unable to be completed due to vessels in the area likely  Patient presented with per rectal bleed and recent CT reports of colonic diverticulosis, reported painless bleed.  Labs at outside hospital yesterday showed normal hemoglobin, however today hemoglobin of 7.6 despite any further bleed in addition to hypokalemia, hypocalcemia and hypoglycemia, it is very likely that there was a lab error and therefore labs were repeated.  We will continue to monitor patient clinically for VT bleed and hemoglobin.  GI consult.     collaterals.  He had a follow-up EGD that showed small varices.  -Otherwise has been doing well.  He was approved to be listed for kidney transplant, waiting for his son to complete his evaluation.  He is on Eliquis,to change to warfarin when he is not active on the list.  -He does not drink any alcohol    ROS: 14 point ROS negative except for positives noted in HPI.    PMHx:  Past Medical History:   Diagnosis Date    Anemia     Antiplatelet or antithrombotic long-term use     Arthritis     Right Knee    Coagulation disorder (H24)     Protein C deficiency    Esophageal varices (H)     Gastro-oesophageal reflux disease     History of blood transfusion     Hypertension     IgA nephropathy     Liver disease     non alcoholic fatty liver    GREG (obstructive sleep apnea)     Portal hypertension (H)     Renal disease     CKD    Thrombosis of leg     +DVT       PSHx:  Past Surgical History:   Procedure Laterality Date    BIOPSY      2014    COLONOSCOPY      ENDOSCOPIC ULTRASOUND UPPER GASTROINTESTINAL TRACT (GI) N/A 5/13/2024    Procedure: ENDOSCOPIC ULTRASOUND, ESOPHAGOSCOPY / UPPER GASTROINTESTINAL TRACT (GI);  Surgeon: Guru Meseret Corrales MD;  Location: [facility] OR    ENT SURGERY      tonsils    ESOPHAGOSCOPY, GASTROSCOPY, DUODENOSCOPY (EGD), COMBINED N/A 6/27/2024    Procedure: ESOPHAGOGASTRODUODENOSCOPY, WITH BIOPSY;  Surgeon: Guru Meseret Corrales MD;  Location: [facility] GI    EYE SURGERY      lasik    IR IVC FILTER PLACEMENT      IR IVC FILTER REMOVAL  12/17/2014    TIPS PROCEDURE         FamHx:  Family History   Problem Relation Age of Onset    Protein C deficiency Niece     Kidney Disease No family hx of     Anesthesia Reaction No family hx of     Bleeding Disorder No family hx of      No family history of liver disease, liver cancer    SocHx:  Social History     Socioeconomic History    Marital status:      Spouse name: Not on file    Number of children: Not on file    Years of  "education: Not on file    Highest education level: Not on file   Occupational History    Not on file   Tobacco Use    Smoking status: Never    Smokeless tobacco: Never   Vaping Use    Vaping status: Never Used   Substance and Sexual Activity    Alcohol use: No    Drug use: No    Sexual activity: Not on file   Other Topics Concern    Parent/sibling w/ CABG, MI or angioplasty before 65F 55M? Not Asked   Social History Narrative    Not on file     Social Determinants of Health     Financial Resource Strain: High Risk (1/1/2022)    [facility] [facility]    Financial Resource Strain     Difficulty of Paying Living Expenses: Not on file     Difficulty of Paying Living Expenses: Not on file   Food Insecurity: Not on file   Transportation Needs: Not on file   Physical Activity: Not on file   Stress: Not on file   Social Connections: Unknown (4/10/2023)    Received from [employer] [facility], [facility]    Social Connections     Frequency of Communication with Friends and Family: Not on file   Interpersonal Safety: Not on file   Housing Stability: Not on file       Medications:  Current Outpatient Medications   Medication Sig Dispense Refill    B Complex-C-Zn-Folic Acid (DIALYVITE/ZINC) TABS Take 1 tablet by mouth every morning      calcium acetate (PHOSLO) 667 MG CAPS capsule Take 667 mg by mouth 3 times daily (with meals)      ELIQUIS ANTICOAGULANT 2.5 MG tablet Take 1 tablet by mouth 2 times daily      rosuvastatin (CRESTOR) 5 MG tablet Take 5 mg by mouth every morning       No current facility-administered medications for this visit.     No OTCs, herbals    Allergies:  No Known Allergies    Objective:  /62 (BP Location: Right arm, Patient Position: Sitting, Cuff Size: Adult Large)   Pulse 74   Ht 1.702 m (5' 7\")   Wt 115.3 kg (254 lb 3.2 oz)   SpO2 100%   BMI 39.81 kg/m  "   Constitutional: pleasant man in NAD  Eyes: non icteric  Respiratory: Normal respiratory excursion   MSK: normal range of motion of visualized extremities  Abd: Non distended  Skin: No jaundice  Psychiatric: normal mood and orientation    Labs:  Last Comprehensive Metabolic Panel:  Sodium   Date Value Ref Range Status   04/11/2024 141 135 - 145 mmol/L Final     Comment:     Reference intervals for this test were updated on 09/26/2023 to more accurately reflect our healthy population. There may be differences in the flagging of prior results with similar values performed with this method. Interpretation of those prior results can be made in the context of the updated reference intervals.    09/11/2014 140 133 - 144 mmol/L Final     Potassium   Date Value Ref Range Status   05/13/2024 4.0 3.4 - 5.3 mmol/L Final   09/11/2014 4.2 3.4 - 5.3 mmol/L Final     Chloride   Date Value Ref Range Status   04/11/2024 99 98 - 107 mmol/L Final   09/11/2014 108 94 - 109 mmol/L Final     Carbon Dioxide   Date Value Ref Range Status   09/11/2014 23 20 - 32 mmol/L Final     Carbon Dioxide (CO2)   Date Value Ref Range Status   04/11/2024 27 22 - 29 mmol/L Final     Anion Gap   Date Value Ref Range Status   04/11/2024 15 7 - 15 mmol/L Final   09/11/2014 9 6 - 17 mmol/L Final     Glucose   Date Value Ref Range Status   04/11/2024 103 (H) 70 - 99 mg/dL Final   09/11/2014 147 (H) 70 - 99 mg/dL Final     Comment:     Effective 7/30/2014, the reference range for this assay has changed to reflect   new instrumentation/methodology.       Urea Nitrogen   Date Value Ref Range Status   04/11/2024 37.8 (H) 8.0 - 23.0 mg/dL Final   09/11/2014 18 7 - 30 mg/dL Final     Comment:     Effective 7/30/2014, the reference range for this assay has changed to reflect   new instrumentation/methodology.       Creatinine   Date Value Ref Range Status   05/13/2024 3.85 (H) 0.67 - 1.17 mg/dL Final   09/11/2014 1.34 (H) 0.66 - 1.25 mg/dL Final     GFR Estimate    Date Value Ref Range Status   05/13/2024 17 (L) >60 mL/min/1.73m2 Final   09/11/2014 56 (L) >60 mL/min/1.7m2 Final     Comment:     Non  GFR Calc     Calcium   Date Value Ref Range Status   04/11/2024 10.1 8.8 - 10.2 mg/dL Final   09/11/2014 9.1 8.5 - 10.1 mg/dL Final     Comment:     Effective 7/30/2014, the reference range for this assay has changed to reflect   new instrumentation/methodology.       Bilirubin Total   Date Value Ref Range Status   04/11/2024 0.8 <=1.2 mg/dL Final   09/11/2014 0.5 0.2 - 1.3 mg/dL Final     Alkaline Phosphatase   Date Value Ref Range Status   04/11/2024 69 40 - 150 U/L Final     Comment:     Reference intervals for this test were updated on 11/14/2023 to more accurately reflect our healthy population. There may be differences in the flagging of prior results with similar values performed with this method. Interpretation of those prior results can be made in the context of the updated reference intervals.   09/11/2014 43 40 - 150 U/L Final     ALT   Date Value Ref Range Status   04/11/2024 17 0 - 70 U/L Final     Comment:     Reference intervals for this test were updated on 6/12/2023 to more accurately reflect our healthy population. There may be differences in the flagging of prior results with similar values performed with this method. Interpretation of those prior results can be made in the context of the updated reference intervals.     09/11/2014 35 0 - 70 U/L Final     AST   Date Value Ref Range Status   04/11/2024 33 0 - 45 U/L Final     Comment:     Reference intervals for this test were updated on 6/12/2023 to more accurately reflect our healthy population. There may be differences in the flagging of prior results with similar values performed with this method. Interpretation of those prior results can be made in the context of the updated reference intervals.   09/11/2014 47 (H) 0 - 45 U/L Final       Lab Results   Component Value Date    WBC 6.7  04/11/2024    WBC 6.0 10/07/2014     Lab Results   Component Value Date    RBC 4.54 04/11/2024    RBC 4.14 10/07/2014     Lab Results   Component Value Date    HGB 14.0 04/11/2024    HGB 11.8 10/07/2014     Lab Results   Component Value Date    HCT 42.7 04/11/2024    HCT 36.2 10/07/2014     Lab Results   Component Value Date    MCV 94 04/11/2024    MCV 87 10/07/2014     Lab Results   Component Value Date    MCH 30.8 04/11/2024    MCH 28.5 10/07/2014     Lab Results   Component Value Date    MCHC 32.8 04/11/2024    MCHC 32.6 10/07/2014     Lab Results   Component Value Date    RDW 13.3 04/11/2024    RDW 16.7 10/07/2014     Lab Results   Component Value Date     05/13/2024     10/07/2014       INR   Date Value Ref Range Status   04/11/2024 1.30 (H) 0.85 - 1.15 Final   09/10/2014 1.06 0.86 - 1.14 Final        MELD 3.0: 21 at 4/11/2024 11:54 AM  MELD-Na: 23 at 4/11/2024 11:54 AM  Calculated from:  Serum Creatinine: 4.90 mg/dL (Using max of 3 mg/dL) at 4/11/2024 11:54 AM  Serum Sodium: 141 mmol/L (Using max of 137 mmol/L) at 4/11/2024 11:54 AM  Total Bilirubin: 0.8 mg/dL (Using min of 1 mg/dL) at 4/11/2024 11:54 AM  Serum Albumin: 4.7 g/dL (Using max of 3.5 g/dL) at 4/11/2024 11:54 AM  INR(ratio): 1.30 at 4/11/2024 11:54 AM  Age at listing (hypothetical): 63 years  Sex: Male at 4/11/2024 11:54 AM      Previous work-up:   Lab Results   Component Value Date    HEPBANG Nonreactive 02/08/2024    HBCAB Nonreactive 02/08/2024    AUSAB >1,000.00 02/08/2024    HCVAB Nonreactive 02/08/2024    CHOL 129 04/11/2024    HDL 47 04/11/2024    LDL 58 04/11/2024    TRIG 118 04/11/2024    A1C 6.0 (H) 02/08/2024      Lab Results   Component Value Date    SPECDES  02/08/2024     Blood: ACD      LDRESULTS  02/08/2024       Factor V 1691G>A (Leiden)  RESULTS:  Mutation analyzed: 1691G>A  Factor V 1691G>A (Leiden)  Interpretation:  ABSENT  Factor V 1691G>A (Leiden) mutation  genotype:  G/G    FACTOR 2/PROTHROMBIN  RESULTS:  Mutation analyzed: [id number]>A  Factor 2 Mutation Interpretation:  ABSENT  Factor 2 Mutation genotype:  G/G           Imaging:        Radiology  CT Abdomen Pelvis w/o Contrast 2/2024  1.  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 Slight decrease in splenomegaly with multifocal areas of  infarcts again noted.  2.  Kidneys are slightly atrophic with bilateral cysts suspected.  3.  Stable presumed intraparenchymal lymph node along the right major  fissure.  4.  Bilateral fat-containing inguinal hernias with extension of the  anterior bladder wall towards the left hernia.    CT Abdomen Pelvis w/Contrast 10/2014  1. Splenorenal embolization changes are again seen. Multiple  associated splenic infarctions.  2. Slight decrease in splenomegaly. Multiple portal-systemic  collateral vessels are again seen.  3. Unchanged 0.8 cm pleural-based nodule along the right major  fissure. Continued followup to document two-year stability is  recommended.  4. Unchanged mild herniation of the urinary bladder the left inguinal  canal.    CT Abdomen Pelvis w/Contrast 7/2014  1. Innumerable perisplenic, gastric, and gastrorenal varicose veins.  Multiple varicose veins in gastric fundus varices with scattered  distal esophageal varices.  2. 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  3. Splenomegaly.  4. Infrarenal IVC filter.  5. A 6 cm nodule in the right lower. Recommend followup with low dose  chest CT in 12 months as per Fleischner Society criteria.    Endoscopy:    EGD 6/2024    Findings:       Grade I varices were found in the lower third of the esophagus.        Localized moderately erythematous mucosa without bleeding was found in        the gastric antrum. Biopsies were taken with a cold forceps for        Helicobacter pylori testing.        Localized mildly erythematous mucosa without active  bleeding and with no        stigmata of bleeding was found in the duodenal bulb.                                                                                    Impression:            - Grade I esophageal varices.                          - Erythematous mucosa in the antrum. Biopsied.                          - Normal examined duodenum.       EGD 5/2024    Findings:       Grade II varices were found in the lower third of the esophagus. Six        bands were successfully placed with complete eradication, resulting in        deflation of varices. There was no bleeding during and at the end of the        procedure.        The entire examined stomach was normal.        The examined duodenum was normal.        ENDOSONOGRAPHIC FINDING: :        The major papilla was endoscopically and sonographically normal. The        common bile duct was followed from the major papilla to the liver and no        stones were seen. The duct was non-dilated. The gallbladder was        sonographically normal without stones or sludge. There was no        gallbladder wall thickening or pericholecystic fluid. There was no        intrahepatic biliary dilation        The pancreatic parenchyma appeared unremarkable. There were no features        of chronic pancreatitis. No masses, cysts or stones were visualized in        the pancreatic parenchyma. The main pancreatic duct was followed from        the head to the body, excluding pancreas divisum. The pancreatic duct        measured 1.4 mm in the body of the pancreas.        Endosonographic imaging in the visualized portion of the liver showed no        intrahepatic ductal dilation, lesion or mass. The portal vein was        visualized, the hepatic vein/IVC had some vessels intervening and after        discussion with Dr Tomlinson, it was decided to hold off EUS PPG measurements                                                                                    Impression:            - Grade II  esophageal varices. Completely eradicated.                          Banded.                          - Normal stomach.                          - Normal examined duodenum.                          - EUS PPG measurements were not possible because of                          intervening vessls precluding HV or IVC access                          - Pancreas, bile duct and GB were otherwise                          unremarkable     EGD 5/27/2022  - Normal esophagus.        - Bleeding gastropathy, suspect portal HTN gastropathy.        - Normal examined duodenum.     EGD 12/31/2020  - Normal esophagus.        - healed , clips present        - Normal examined duodenum.     EGD 10/15/2020  - Non-bleeding diminutive esophageal varices.        - Oozing gastric ulcer with oozing hemorrhage (Sundeep Class Ib).        Injected. Clips were placed.        - Duodenitis.        - No specimens collected.       Independently reviewed labs and imaging.     Assessment/Plan: Mr. 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     He has a history of bleeding varices status post banding and partial splenic embolization over 10 years ago.  In the interim he had a EGD that showed grade 2 varices that were banded.  Follow-up EGD showed small varices.  His beta-blocker was stopped due to low blood pressures during dialysis.  He has not developed any ascites or other complications of portal hypertension after having this clot for 10 years.  His most recent ultrasound with Doppler showed a small splenic vein, portal vein was patent but it is possible this was a collateral as he has had cavernous transformation on previous more dedicated abdominal imaging.  Unless the transplant team required more updated imaging of his vasculature or there was clinical progression of portal HTN, there is not a strong  reason to get a CT or MRI.  Even if he were to have progression of his thrombosis, it is not clear we would have any changes in his management.  He needs to be on lifelong anticoagulation.  It would be very difficult to recanalize this chronic clot, it is not guaranteed to be successful and would come with significant risks.  In terms of risk portal HTN decompensation post kidney transplant, his case is unique and that he has the PVT without signs of liver disease. He appears to have intact liver synthetic function.  He has never had a biopsy, but his liver has been normal on imaging including no steatosis or concerns for fibrosis.  His albumin is normal indicating normal synthetic function.  Given the absence of intrinsic liver disease he is less likely to have typical liver decompensation after an abdominal surgery.  His case was reviewed at the kidney transplant team and given his intact hepatic function his PVT is not a barrier to transplant.  Discussed that increased collaterals in the abdomen may make him more likely to have bleeding complications.    -  repeat EGD 6/2025  -  consider repeat US 4/2025    Orders Placed This Encounter   Procedures    Adult GI  Referral - Procedure Only       RTC 12 months.      Carol Ann Taylor MD MS  Hepatology/Liver Transplant  [facility]

## 2024-09-15 ENCOUNTER — HEALTH MAINTENANCE LETTER (OUTPATIENT)
Age: 64
End: 2024-09-15

## 2024-10-09 ENCOUNTER — LAB (OUTPATIENT)
Dept: LAB | Facility: CLINIC | Age: 64
End: 2024-10-09
Payer: COMMERCIAL

## 2024-10-09 DIAGNOSIS — Z76.82 AWAITING ORGAN TRANSPLANT: ICD-10-CM

## 2024-10-09 PROCEDURE — 86833 HLA CLASS II HIGH DEFIN QUAL: CPT

## 2024-10-09 PROCEDURE — 86832 HLA CLASS I HIGH DEFIN QUAL: CPT

## 2024-10-30 LAB
PROTOCOL CUTOFF: NORMAL
SA 1  COMMENTS: NORMAL
SA 1 CELL: NORMAL
SA 1 TEST METHOD: NORMAL
SA 2 CELL: NORMAL
SA 2 COMMENTS: NORMAL
SA 2 TEST METHOD: NORMAL
SA1 HI RISK ABY: NORMAL
SA1 MOD RISK ABY: NORMAL
SA2 HI RISK ABY: NORMAL
SA2 MOD RISK ABY: NORMAL
UNACCEPTABLE ANTIGENS: NORMAL
UNOS CPRA: 63

## 2024-11-20 ENCOUNTER — COMMITTEE REVIEW (OUTPATIENT)
Dept: TRANSPLANT | Facility: CLINIC | Age: 64
End: 2024-11-20
Payer: COMMERCIAL

## 2024-11-20 NOTE — COMMITTEE REVIEW
"Kidney/Pancreas Committee Review Note     Evaluation Date: 2/8/2024  Committee Review Date: 11/20/2024    Organ being evaluated for: Kidney    Transplant Phase: Evaluation  Transplant Status: Active    Transplant Coordinator: Gracie Carbajal  Transplant Surgeon: Dr. Hussain Farmer       Referring Physician: Dr. Anderson Davey    Primary Diagnosis: IgA Nephropathy  Secondary Diagnosis:     Committee Review Members:  Nephrology Rachael Krishnamurthy, NP, Ashanti Aguirre MD, Timmy Lopez, APRN CNP, Souleymane Coelho MD, Jose Contreras MD   Nutrition Jordyn Saldivar, PATTI   Pharmacist Regina Nix, [facility]    - Clinical Jacqueline Wheat, MSW, Mark Martin MSW   Transplant GAGAN FLORES, TAMI, Yelena Flores, TAMI, Lenora Mike, TAMI, SARAY Pineda CNP, Gracie Carbajal, TAMI, Deepti Hicks, RN, Jeanne Colón, RN, Nae Cunningham, RN, Charlette Ly, TAMI, Bita Snell, RN   Transplant Surgery Thanh Oseguera MD       Transplant Eligibility: Irreversible chronic kidney disease treated w/dialysis or expected need for dialysis    Committee Review Decision: Approved    Relative Contraindications: None    Absolute Contraindications: None     Committee Chair Thanh Oseguera MD verbally attested to the committee's decision.    Committee Discussion Details: The pt's full work up was approved for a potential living donor kidney transplant on 6/5/24.  The pt's living donor did not complete their work up. The Committee reviewed the pt's current status and the last note from Dr. Taylor w/ GI on 8/5/24 who notes, \"Given the absence of intrinsic liver disease he is less likely to have typical liver decompensation after an abdominal surgery. His case was reviewed at the kidney transplant team and given his intact hepatic function his PVT is not a barrier to transplant. Discussed that increased collaterals in the abdomen may make him more likely to have bleeding complications. \" The pt is currently on " Eliquis and will need to transition to Warfarin prior to being listed for kidney transplant. The pt is approved for active listing status on the kidney transplant wait list once he is on Warfarin. Pt will be called and summary letter will be sent.

## 2024-11-21 ENCOUNTER — TELEPHONE (OUTPATIENT)
Dept: TRANSPLANT | Facility: CLINIC | Age: 64
End: 2024-11-21
Payer: COMMERCIAL

## 2024-11-21 NOTE — LETTER
11/21/24        Bao Keenan  [id number] [address] [id number]        Dear Bao,    It is a pleasure to work with you toward the goal of kidney transplantation. Your pre-transplant evaluation results were reviewed at our Multidisciplinary Selection Committee on 11/20/24. The Committee has approved you to be listed active status on the kidney transplant waiting list once you have been transitioned from Eliquis to Warfarin. We will work with the prescribing provider to make that change.      For any questions, please contact the Transplant Office at [phone] or you may reach me directly at [phone]      Sincerely,  Gracie Carbajal RN, Pre-Kidney/Pancreas Transplant Coordinator  Solid Organ Transplant  [facility], [facility]    CC: Care Team

## 2024-11-21 NOTE — TELEPHONE ENCOUNTER
Called pt to discuss outcome of Selection Committee. We discussed that he has been approved to be listed active status on the kidney transplant wait list but will need to transition from Eliquis to Warfarin before we can list him. I will reach out to his prescriber to discuss the transition and will follow up with him. He was in good agreement with the plan and had no further questions.     Called pt's PCP office - Dr. Eric, spoke w/ TAMI Negro to discuss transition from Eliquis to Warfarin. She stated that Dr. Eric is currently out for a few weeks but she will have a covering provider review.

## 2024-12-03 ENCOUNTER — TELEPHONE (OUTPATIENT)
Dept: TRANSPLANT | Facility: CLINIC | Age: 64
End: 2024-12-03
Payer: COMMERCIAL

## 2024-12-03 NOTE — LETTER
December 3, 2024    Bao Keenan  [address] [address] [address]      Dear Mr. Keenan,    This letter is sent to confirm that you have completed your transplant work-up and you are a candidate in the kidney transplant program at the [facility].  You were placed on the kidney active waitlist on 12/3/24.      When you are active on the waitlist and an organ becomes available, a coordinator will need to speak to you immediately.  You could be contacted at any time during the day or night as an organ could become available at any time.  Please make certain our office always has your current telephone numbers and address.      Items we will need from you:    We have received approval from your insurance company for the transplant procedure.  It is critical that you notify us if there is any change in your insurance.  It is also important that you familiarize yourself with the details of your specific insurance policy.  Our patient  is available to assist you if you should have any questions regarding your coverage.    An ALA or PRA blood sample will need to be sent here every 3 months to match you with  donors or any potential living donors. Special mailing boxes (called mailers) were sent to your dialysis unit directly from the Outreach Department. Your next sample is due on 25, please remind your dialysis nurse to draw this sample. Additional mailers can be obtained by calling the Transplant Office and asking to speak to a kidney .    During this waiting period, we may request additional periodic laboratory tests with your primary physician.  It will be your responsibility to remind your physician to forward your results to the Transplant Office.    We need to be kept informed of any changes in your medical condition such as:    changes in your medications,   significant changes in your  health  significant infections (such as pneumonia or abscesses)  blood transfusions  any condition which requires hospitalization  any surgery    Remember to complete any routine cancer screening tests required before your transplant.  This includes colonoscopy; prostate screening for men, and mammogram and gynecologic testing for women, as well as dental work.  Your primary care clinic can assist you with arranging for these exams.  Remind your caregivers to forward copies of the records and final reports.      We want you to know that the [facility] Transplant Program has physician and surgeon coverage 24 hours a day, 365 days a year, and is readily available to facilitate organ acceptance, procurement, and transplantation in a timely manner. Our additional transplant surgeons and physicians are credentialed by the hospital to provide transplant services and can independently manage the care of transplant patients. Our additional transplant surgeons are also able to independently perform transplant operations and organ procurement procedures. Additionally, our transplant surgeons and physicians will not be on call for two or more transplant programs more than 30 miles apart unless the circumstances have been reviewed and approved by the [employer] Network for Organ Sharing (UNOS) Membership and Professional Standards Committee (MPSC). Finally, our primary physicians and primary surgeons are not designated as the primary surgeon or primary physician at more than 1 transplant hospital. If this coverage changes or there are substantial program changes, you will be notified in writing by letter.    Attached is a letter from UNOS that describes the services and information offered to patients by UNOS and the Organ Procurement and Transplantation Network (OPTN).    We appreciate having had the opportunity to participate in your care.  If you have questions, please feel free to call the Transplant Office at  [phone] or [phone].  Per our normal office work flow, your new wait list coordinator will be TAMI Tatum. Deepti can be reached at [phone].      Sincerely,  Gracie Carbajal RN, Pre-Kidney/Pancreas Transplant Coordinator  Solid Organ Transplant  [facility], [facility]      Enclosures: ALA/PRA Physician Order, Telephone Contact List, Travel Resources, UNOS Letter, Waitlist Information Update, and While You Are Waiting  cc: Care Team                  The Organ Procurement and Transplantation Network Toll-free patient services line:  [phone]  Your resource for organ transplant information    Staffed 8:30 am - 5:00 pm ET Monday - Friday Leave a message 24/7 to receive a call back    The Organ Procurement and Transplantation Network (OPTN) is the national transplant system. It makes the policies that decide how donated organs are matched to patients waiting for a transplant. The OPTN:    Makes sure donated organs get matched to people on the transplant waiting list  Tells people about the donation and transplant processes  Makes sure that the public knows about the need for more organ and tissue donations    The OPTN has a free patient services line that you can call to:  Get more information about:  Organ donation and organ transplants  Donation and transplant policies  Get an information kit with:  A list of transplant hospitals  Waiting list information  Talk about any questions you may have about your transplant hospital or organ procurement organization. The staff will do their best to help you or point you to others who may help.  Find out how you can volunteer with the OPTN and help shape transplant policy     The patient services line number is: [phone]    Patient services line staff CANNOT answer questions about your own medical care, including:  Waiting list status  Test results  Medical  records  You will need to call your transplant hospital for this information.    The following websites have more information about transplantation and donation:    OPTN: https://optn.transplant.hrsa.gov/    For potential living donors and transplant recipients:    Living with transplant: https://www.transplantliving.org/    Living donation process: https://optn.transplant.hrsa.gov/living-donation/    Financial assistance: https://www.livingdonorassistance.org/    Transplantation data: https://www.srtr.org/    Organ donation: https://www.organdonor.gov/    Volunteer with the OPTN: https://optn.transplant.hrsa.gov/get-involved/

## 2024-12-03 NOTE — TELEPHONE ENCOUNTER
Called patient to inform them of status change to ACTIVE on the Kidney alone list today 12/3/24. Status change letter and PRA orders to be mailed. Patient is in agreement with listing ACTIVE status.      Discussed:   - Pt is eligible for  donor offers and pt can receive calls 24 hours a day, 7 days a week, and on call staff need to be able to reach pt or one of emergency contacts within 30 minutes or they might skip over to next recipient on list.   -Please make sure your phone is charged at all times and your voicemail is not full   -Your transplant on call coordinator will give you directions about when and where you will need to come to the hospital for transplant  -The transplant coordinator will call you to discuss your current health status, the offer, and plans to move forward.  Some organ offer calls will require you to come to the hospital immediately; some may not be as time sensitive.  It may be possible for you to come to the hospital and, for various reasons, the transplant could be cancelled.  This is often called a  dry run .  - Reviewed the right to accept or decline offer, without penalty  - Expected length of surgical procedure is about 4-6 hours, expected inpatient length of stay post transplant is 3-4 days, and potential to stay locally post transplant. Offered resources for lodging in the area  - Verified contact information as documented in Epic. Confirmed emergency contact information  -Instructed patient about importance of having PRAs drawn every 3 months via: (Mailers/[facility]). Encouraged them to set reminder in their preferred calendar to stay on top of their quarterly labs  -Reminded pt to stay up on health maintenance and to call with any updates on their health status, insurance or contact information.   -Reminded pt to inform RNCC as soon as possible if they test positive for COVID.  -Donor website was provided     -Last PRA was 10/9/24     -Provided name and contact information  for additional questions or concerns.  Pt expressed excellent understanding of all and was in good agreement with the plan.      Hepatitis C Donor Acceptance      -Called pt in regards to the possibility of accepting a  or living donor kidney that has known Hepatitis C infection (past or current).  Informed pt due to shortage of organs, the transplant team is always looking for safe alternatives to increase chances of transplant. One means of increasing chance of transplant is to accept an organ that may have been exposed to hepatitis C.  Even if the organ accepted has preliminary evidence of hepatitis C infection, there is a possibility that pt may not contract hepatitis C from it.  If hepatitis C is contracted, it is treatable.     -Reviewed that Hepatitis C (HCV) is an infection caused by the Hepatitis C virus. Hepatitis C may be spread from one person to another through contact with the blood of an infected person.  This virus can attack the liver and cause injury. If not treated, this virus can lead to liver injury.     -Reviewed that all  donor offer organs are checked for hepatitis C infection. A small number of organs test clearly positive for active hepatitis C infection (Approximately 5% nationally of  donors). A few organs have testing that can indicate the donor may have been infected with hepatitis C but it is not clear whether the organ is currently infected with the virus or may have been infected in the past.     -If the donor has been exposed to hepatitis C but DOES NOT have active virus in the bloodstream, it is very unlikely that pt will develop hepatitis C infection. If the donor DOES have active virus in the bloodstream, there is a very high likelihood (almost 100%) that hep C will develop. Regardless, pt will be monitored closely by your post transplant team for any signs of Hepatitis C infection and treated appropriately.     -Reviewed there are several medications  available to treat hepatitis C, and the treatments are more effective and have fewer side effects than previous treatment options. These medications are taken by mouth daily for 4 to 24 weeks.  When taken as prescribed, these medications have cure rates of 90% to 100% based on currently available data from large studies.  They are safe to take after transplant.     -Reviewed that if pt chooses not to accept a hepatitis C exposed donor, it will not affect their status on the transplant list.  Donor organs that are hepatitis C-exposed will not be offered to you. We will continue to consider non-hepatitis C -exposed donor offers for you. Pt care will not be negatively affected in any way.      -Answered patient questions regarding Hep C donor acceptance     -Pt would like to review consent and pt education and make decision at a later time.  Will send consent and pt education via [employer]. Pt is aware that consent will be required prior to listing change. Once signed consent is uploaded will update chart and UNOS appropriately       Explained pt will transfer to [facility] coordinator, TAMI Tatum. Provided her contact info. Active listing letter sent. Hand off to [facility].

## 2024-12-03 NOTE — LETTER
"  Bao JEISON Keenan  [address] [address] [address]                December 3, 2024      Dear Marito,  Below is information about getting \"the call\" as well as post-transplant expectations. I wish you all the best on your transplant journey.    Regards,  TAMI Bowman                Time of  The Call      The call for your offer can come in at any time of the day or night.  You need to make sure to have your phone on and charged and be available .  Please make sure your voicemail is set up and not full. Our on call staff will need to be able to reach you or an emergency contact within 30 minutes or they may skip over to the next recipient on the list. Please inform your emergency contact when you become active on the list so they are aware they may be contacted as well.     The transplant coordinator will call you to discuss your current health status, the offer, and plans to move forward.      Some organ offer calls will require you to come to the hospital immediately; some may not be as time sensitive, some cases lasting upwards of 48 hours.  It may be possible for you to come to the hospital for transplant, but due to various reasons the transplant could be cancelled.  This is often called a  dry run .      Types of  Donor Offer Details     Donation after Brain Death (DBD).  Brain death occurs when blood, and the oxygen it carries, cannot flow to the brain.  The person's heart is still beating and providing blood and oxygen to the rest of the body because he/she is on a ventilator.  In brain death, the organs remain healthy and can be removed for transplant.     Donation after Cardiac Death (DCD).  It can occur when a patient has a non-survivable, irreversible, severe brain injury but does not meet criteria for the diagnosis of brain death.  If the family decides to withdraw the patient from treatment, it may be possible for that person to be an organ donor.  Organ donation can only occur if the " death happens rapidly.   Your transplant team will discuss the details of your donor with you.     Important Terminology      Donors With Risk Criteria: Donors with risk factors or behaviors increasing their risk of having certain infections that could be transmitted during transplant surgery that are labeled as having risk criteria.  However, donors undergo extensive testing for infections such as HIV and Hepatitis and risk of transmission is low.  Often, the benefit of getting a transplant outweighs the risk of getting an infection.  Your transplant team will discuss this with you in more detail at the time of transplant.     Kidney Donor Profile Index: Donors get scored 1-100 based on their age, sex, cause of death and organ function.  This score is a very general predictor of future kidney performance. On average, kidneys with a higher score may not function as long as kidneys with a lower score.  Your transplant team will discuss this with you and determine if this offer is right for you.     Coming in For Surgery     What to Bring.  When coming to the hospital for transplant, bring your identification, insurance information, and a current list of medications.  You may also bring a small bag.  We recommend leaving valuables at home.        Your Transplant Stay  On average- the expected length of the kidney transplant is about 3-4 hours  On average, the expected length of stay is about 3-5 days depending on the course of the transplant outcomes     NOT LOCAL?  Please plan to stay locally with a caregiver for two weeks  Please bring your home medications for your post-transplant stay        While you are waiting on for the call:     Prepare Yourself Medically:     Take care of your health.  Take your prescribed medications.  Keep your scheduled appointments.  Stay up to date on routine medical care with your primary care doctor (mammograms, colonoscopies, etc.).  Keep track of dates you get vaccinations or blood  transfusions.  Visit your dentist regularly.  Notify your transplant coordinator immediately if your health changes.  Follow diet and exercise guidelines.  Staying as active as possible and eating a healthy diet is important while waiting for your transplant.  Women of Childbearing Age: Ask your medical team about birth control, pregnancy, and what precautions you should take before/after transplant.     Prepare Yourself Financially:  The cost of transplant varies depending on the organ type, insurance coverage, and other factors. Talk to your , , or health insurance provider about the cost of your procedure and the needed care after transplant. Notify your coordinator immediately if your insurance coverage changes.        Prepare Yourself Emotionally:  Reach out to your support system to let them know what is going on in your life.     Prepare Yourself Practically:     Make sure you are available.  Your team needs to know how to reach you at all times.  Update your coordinator with any telephone number changes immediately.   Select your primary support person.  Choose someone you feel close to who has the time, health, and flexibility to be your caregiver.  Organize your personal affairs.  Consider filling out an advanced directive, Family Medical Leave paperwork, insurance paperwork in advance when possible.              Arrange transportation.  Make arrangements well in advance for your plan to get to the transplant center so you are ready at the time of the call    What to Expect Post Transplant               -If you will be lodging in / near [place], bring all of your daily home medications               -Expect to be near the [facility] for at least 14 days after hospital discharge with your support person / . There is no driving for 2 weeks post-transplant              Immunosuppression Medications              -You will be on immunosuppression medications the  rest of your life to maintain the health of your transplant      Day of Discharge:               -Average length of stay is 4 days.              -There is a possibility that you may be discharged with a urinary catheter, abdominal drain, and / or dialysis line   - You will  your new medications and start taking them as prescribed     Day 1 and Day 2    - You will have blood work drawn at 7:15 AM   -You will be seen in the [facility] at 8 AM    **Bring ALL of your medications     **Bring your transplant handbook and medication card (given to you during your hospital stay.    **Bring your support person.    - The provider will review the blood work and assess you     - The nursing staff will carry out any plan of care / therapy the provider orders. The appointment may last 30 minutes - 6 hours.  You will receive additional transplant education   **You may require additional ATC visits or outpatient clinic visits with daily labs.    -Your post-transplant coordinator will call you for an introductory call and next step planning     Upon completion of ATC               -Morning labs are required every Monday and Thursday for the first 2 months post-transplant (12 hours after your evening dose of immunosuppression).              -Blood work will be required for the duration of your transplant life. Frequency will decrease.               -You are expected to make these lab appointments at your desired lab location. Please notify your coordinator if you're planning to draw labs outside of the [facility]              -Your lab levels will be followed closely by your post-transplant team and interventions will be made accordingly         Return Appointments               -You will see a member of the surgical team about 2 weeks post-transplant.              -You will see the transplant nephrologists frequently within the first year post transplant, and then every 6 months to 1 year.              -If  you had a ureteral stent placed, this will be removed around one month post-transplant.              -You will work with the transplant Social Work team and Pharmacist as needed.  - Expect to return as clinically indicated.     Caregiver Support              -It is important to have a network of social (family, friends, etc) support throughout your entire transplant journey!                Primary Care Physician              -It is your responsibility to establish with a primary care physician and follow up with them annually.               -It is expected that you follow up with your primary care physician with any non-transplant related concerns and age-appropriate health maintenance (screenings, vaccination, etc)     Complications              -Your transplant team will share with you potential complications. If these occur, the team will work for your best outcomes and support you through your evolving care.     Patient Expectations               -Drink 2-3 Liters of hydrating fluid per day               -Monitoring vital signs at home 1-2 times per day               -Weighing yourself daily               -Manage medications and refills (if you are running low on medications, please contact your pharmacy or transplant coordinator)  - Make sure you are available for and returning communication by telephone and MyChart!  -Tell your transplant team about any new medication you are started on by any other provider (including over-the-counter medications and herbal supplements).  - Know your transplant coordinator and how to get ahold of them ([phone])!   - ASK QUESTIONS!

## 2024-12-04 ENCOUNTER — DOCUMENTATION ONLY (OUTPATIENT)
Dept: TRANSPLANT | Facility: CLINIC | Age: 64
End: 2024-12-04
Payer: COMMERCIAL

## 2024-12-04 RX ORDER — WARFARIN SODIUM 2.5 MG/1
2 TABLET ORAL
COMMUNITY
Start: 2024-12-02

## 2024-12-04 NOTE — PROGRESS NOTES
Called pt to introduce myself as [facility] coordinator and encouraged pt to stay up on health maintenance and to let us know if insurance changes, contact info updates. Med list updated to reflect pt is now on coumadin. Explained [facility] protocol for pt's status and when follow up is needed. Gave pt direct information and encouraged to reach out with any questions/concerns. MC message sent with donor sign up website. Pt updated writer that he is NOT interested in Hep C exposed donors, updated epic.

## 2025-01-06 ENCOUNTER — LAB (OUTPATIENT)
Dept: LAB | Facility: CLINIC | Age: 65
End: 2025-01-06
Payer: COMMERCIAL

## 2025-01-06 DIAGNOSIS — Z76.82 AWAITING ORGAN TRANSPLANT: ICD-10-CM

## 2025-04-12 DIAGNOSIS — Z76.82 AWAITING ORGAN TRANSPLANT: Primary | ICD-10-CM

## 2025-04-21 ENCOUNTER — LAB (OUTPATIENT)
Dept: LAB | Facility: CLINIC | Age: 65
End: 2025-04-21
Payer: COMMERCIAL

## 2025-04-21 DIAGNOSIS — Z76.82 AWAITING ORGAN TRANSPLANT: ICD-10-CM

## 2025-05-02 DIAGNOSIS — Z76.82 AWAITING ORGAN TRANSPLANT: Primary | ICD-10-CM

## 2025-06-06 PROCEDURE — 999N001093 HLA VIRTUAL CROSSMATCH (VXM), LIVING DONOR: Performed by: STUDENT IN AN ORGANIZED HEALTH CARE EDUCATION/TRAINING PROGRAM

## 2025-06-11 ENCOUNTER — TELEPHONE (OUTPATIENT)
Dept: GASTROENTEROLOGY | Facility: CLINIC | Age: 65
End: 2025-06-11
Payer: COMMERCIAL

## 2025-06-11 NOTE — TELEPHONE ENCOUNTER
"Endoscopy Scheduling Screen    Caller: patient    Have you had any respiratory illness or flu-like symptoms in the last 10 days?  No    What is your communication preference for Instructions and/or Bowel Prep?   MyChart    What insurance is in the chart?  Other:  BCBS    Ordering/Referring Provider: IMAN ROSENBAUM   (If ordering provider performs procedure, schedule with ordering provider unless otherwise instructed. )    BMI: Estimated body mass index is 39.81 kg/m  as calculated from the following:    Height as of 8/5/24: 1.702 m (5' 7\").    Weight as of 8/5/24: 115.3 kg (254 lb 3.2 oz).     Sedation Ordered  MAC/deep sedation.   BMI<= 45 45 < BMI <= 48 48 < BMI < = 50  BMI > 50   No Restrictions No [facility]  No [facility]  [facility] with exceptions Hospital Only OR Only       Do you have a history of malignant hyperthermia?  No    (Females) Are you currently pregnant?   No     Have you been diagnosed or told you have pulmonary hypertension?   No    Do you have an LVAD?  No    Have you been told you have moderate to severe sleep apnea?  Yes. Do you use a CPAP? Yes Where is the patient located?. (RN Review required for scheduling unless scheduling in Hospital.)     Have you been told you have COPD, asthma, or any other lung disease?  No    Has your doctor ordered any cardiac tests like echo, angiogram, stress test, ablation, or EKG, that you have not completed yet?  No    Do you  have a history of any heart conditions?  No     Have you ever had or are you waiting for an organ transplant?  Yes. Have you had or are on on the wait list for a heart/lung transplant? No, may schedule at all facilities except [facility]    Have you had a stroke or transient ischemic attack (TIA aka \"mini stroke\") in the last 2 years?   No.    Have you been diagnosed with or been told you have cirrhosis of the liver?   No.    Are you currently on dialysis?   Yes (Hospital Only)    Do you need assistance transferring?   No    BMI: Estimated body mass " "index is 39.81 kg/m  as calculated from the following:    Height as of 8/5/24: 1.702 m (5' 7\").    Weight as of 8/5/24: 115.3 kg (254 lb 3.2 oz).     Is patients BMI > 40 and scheduling location [facility]?  No    Do you take an injectable or oral medication for weight loss or diabetes (excluding insulin)?  No    Do you take the medication Naltrexone?  No    Do you take blood thinners?  Yes, you must contact your prescribing provider for directions on holding or bridging with a different medication.       Prep   Are you currently on dialysis or do you have chronic kidney disease?  Yes ([facility] Prep)    Do you have a diagnosis of diabetes?  No    Do you have a diagnosis of cystic fibrosis (CF)?  No    On a regular basis do you go 3 -5 days between bowel movements?  No    BMI > 40?  No    Preferred Pharmacy:    [id number] [place], [place] - [address] [address]  [address] [facility] [address]  Phone: [phone] Fax: [phone]      Final Scheduling Details     Procedure scheduled  Upper endoscopy (EGD)    Surgeon:  JEISON DIALLO     Date of procedure:  7/31/25     Pre-OP / PAC:   No - Not required for this site.    Location  [facility] - Per RN assessment.    Sedation   MAC/Deep Sedation - Per order.      Patient Reminders:   You will receive a call from a Nurse to review instructions and health history.  This assessment must be completed prior to your procedure.  Failure to complete the Nurse assessment may result in the procedure being cancelled.      On the day of your procedure, please designate an adult(s) who can drive you home stay with you for the next 24 hours. The medicines used in the exam will make you sleepy. You will not be able to drive.      You cannot take public transportation, ride share services, or non-medical taxi service without a responsible caregiver.  Medical transport services are allowed with the requirement that a responsible caregiver will receive you at your destination.  We require " that drivers and caregivers are confirmed prior to your procedure.

## 2025-06-12 ENCOUNTER — TELEPHONE (OUTPATIENT)
Dept: TRANSPLANT | Facility: CLINIC | Age: 65
End: 2025-06-12
Payer: COMMERCIAL

## 2025-06-12 NOTE — TELEPHONE ENCOUNTER
Received call from pt. He is wondering if there are any updates regarding donors. At this time no new updates. Will update pt as soon as donor is approved. Pt verbalized understanding of information and has no further questions. Encouraged to reach out if questions arise.

## 2025-07-10 ENCOUNTER — TELEPHONE (OUTPATIENT)
Dept: GASTROENTEROLOGY | Facility: CLINIC | Age: 65
End: 2025-07-10
Payer: COMMERCIAL

## 2025-07-10 NOTE — TELEPHONE ENCOUNTER
Attempted to contact patient in order to complete pre assessment questions.     No answer. Left message to return call to [phone] option 3.    Callback communication sent via [employer].    Leandra Poole LPN

## 2025-07-10 NOTE — TELEPHONE ENCOUNTER
Pre visit planning completed.    Procedure details:    Patient scheduled for Upper endoscopy (EGD) on 0731/2025.     Arrival time: 0815. Procedure time 0945    Facility location: [facility]; [address], [address], [place] [address]. Check in location: Main entrance at registration desk.  *Disclaimer: Drivers are to check in with patient and stay on campus during procedure.     Sedation type: MAC    Pre op exam needed? No.    Indication for procedure:   I85.01 (ICD-10-CM) - Bleeding esophageal varices, unspecified esophageal varices type      Chart review:     Electronic implanted devices? No    Recent diagnosis of diverticulitis within the last 6 weeks? No    Medication review:    Diabetic? No    Anticoagulants? Yes Coumadin (Warfarin): Recommended HOLD 5 days before procedure.  Consult with your managing provider.    Weight loss medication/injectable? No GLP-1 medication per patient's medication list. Nursing to verify with pre-assessment call.    Other medication HOLDING recommendations:  N/A    Prep for procedure:     Bowel prep recommendation: N/A  Due to: EGD    Procedure information and instructions sent via [employer]       Yulia Oswald RN  Endoscopy Procedure Pre Assessment   [phone] option 3

## 2025-07-19 ENCOUNTER — HEALTH MAINTENANCE LETTER (OUTPATIENT)
Age: 65
End: 2025-07-19

## 2025-07-21 ENCOUNTER — LAB (OUTPATIENT)
Dept: LAB | Facility: CLINIC | Age: 65
End: 2025-07-21
Payer: COMMERCIAL

## 2025-07-21 DIAGNOSIS — Z76.82 AWAITING ORGAN TRANSPLANT: ICD-10-CM

## 2025-07-30 ENCOUNTER — ANESTHESIA EVENT (OUTPATIENT)
Dept: GASTROENTEROLOGY | Facility: CLINIC | Age: 65
End: 2025-07-30
Payer: MEDICARE

## 2025-07-30 NOTE — ANESTHESIA PREPROCEDURE EVALUATION
Anesthesia Pre-Procedure Evaluation    Patient: Bao Keenan   MRN: [id number] : 1960          Procedure : Procedure(s):  Esophagoscopy, gastroscopy, duodenoscopy (EGD), combined         Past Medical History:   Diagnosis Date    Anemia     Antiplatelet or antithrombotic long-term use     Arthritis     Right Knee    Coagulation disorder     Protein C deficiency    Esophageal varices (H)     Gastro-oesophageal reflux disease     History of blood transfusion     Hypertension     IgA nephropathy     Liver disease     non alcoholic fatty liver    GREG (obstructive sleep apnea)     Portal hypertension (H)     Renal disease     CKD    Thrombosis of leg     +DVT      Past Surgical History:   Procedure Laterality Date    BIOPSY          COLONOSCOPY      ENDOSCOPIC ULTRASOUND UPPER GASTROINTESTINAL TRACT (GI) N/A 2024    Procedure: ENDOSCOPIC ULTRASOUND, ESOPHAGOSCOPY / UPPER GASTROINTESTINAL TRACT (GI);  Surgeon: Guru Meseret Corrales MD;  Location: [facility] OR    ENT SURGERY      tonsils    ESOPHAGOSCOPY, GASTROSCOPY, DUODENOSCOPY (EGD), COMBINED N/A 2024    Procedure: ESOPHAGOGASTRODUODENOSCOPY, WITH BIOPSY;  Surgeon: Guru Meseret Corrales MD;  Location: [facility] GI    EYE SURGERY      lasik    IR IVC FILTER PLACEMENT      IR IVC FILTER REMOVAL  2014    TIPS PROCEDURE        No Known Allergies   Social History     Tobacco Use    Smoking status: Never    Smokeless tobacco: Never   Substance Use Topics    Alcohol use: No      Wt Readings from Last 1 Encounters:   24 115.3 kg (254 lb 3.2 oz)        Anesthesia Evaluation   Pt has had prior anesthetic.     No history of anesthetic complications       ROS/MED HX  ENT/Pulmonary:     (+) sleep apnea, uses CPAP,                                      Neurologic:       Cardiovascular: Comment: Held Warfarin for 5 days     (+) Dyslipidemia hypertension- -   -  - -   Taking blood thinners                              Previous cardiac  testing   Echo: Date: Results:    Stress Test:  Date: 2024 Results:  Interpretation Summary  Near maximal stress test, achieved 84% of the age predicted maximal heart  rate. Test stopped due to fatigue.     Normal blood pressure response to exercise.  No angina symptoms with exercise.  No ECG evidence of ischemia.  Normal global LV function with EF of approximately 55-60% at rest.  With exercise, LVEF increases to 60-65% and left ventricular cavity size  decreases appropriately.  No rest or stress-induced regional wall motion abnormalities.  Average functional capacity for age.     No significant valvular dysfunction noted on screening 2D and Doppler  examination.  _____________________    ECG Reviewed:  Date: Results:    Cath:  Date: Results:      METS/Exercise Tolerance:     Hematologic: Comments: Protein C deficiency    (+) History of blood clots,    pt is anticoagulated,           Musculoskeletal:       GI/Hepatic: Comment: NAFLD, Portal HTN    (+) GERD,  esophageal disease, Varices,         liver disease,       Renal/Genitourinary: Comment: IgA Nephropathy    Last HD run yesterday    (+) renal disease (IGA nephropathy), type: ESRD, Pt requires dialysis,           Endo:  - neg endo ROS     Psychiatric/Substance Use:       Infectious Disease:       Malignancy:  - neg malignancy ROS     Other:              Physical Exam  Airway  Mallampati: II  TM distance: >3 FB  Neck ROM: full  Mouth opening: >= 4 cm    Cardiovascular - normal exam Comments: Held Warfarin for 5 days   Dental   (+) Minor Abnormalities - some fillings, tiny chips    increased risk of dental damage  Pulmonary - normal exam      Neurological - normal exam  He appears awake, alert and oriented x3.    Other Findings       OUTSIDE LABS:  CBC:   Lab Results   Component Value Date    WBC 6.7 04/11/2024    WBC 5.1 02/08/2024    HGB 14.0 04/11/2024    HGB 13.8 02/08/2024    HCT 42.7 04/11/2024    HCT 39.8 (L) 02/08/2024     (L) 05/13/2024      "(L) 04/11/2024     BMP:   Lab Results   Component Value Date     04/11/2024     02/08/2024    POTASSIUM 4.0 05/13/2024    POTASSIUM 5.3 04/11/2024    CHLORIDE 99 04/11/2024    CHLORIDE 96 (L) 02/08/2024    CO2 27 04/11/2024    CO2 26 02/08/2024    BUN 37.8 (H) 04/11/2024    BUN 38.7 (H) 02/08/2024    CR 3.85 (H) 05/13/2024    CR 4.90 (H) 04/11/2024     (H) 04/11/2024     (H) 02/08/2024     COAGS:   Lab Results   Component Value Date    PTT 38 02/08/2024    INR 1.30 (H) 04/11/2024     POC: No results found for: \"BGM\", \"HCG\", \"HCGS\"  HEPATIC:   Lab Results   Component Value Date    ALBUMIN 4.7 04/11/2024    PROTTOTAL 8.7 (H) 04/11/2024    ALT 17 04/11/2024    AST 33 04/11/2024    ALKPHOS 69 04/11/2024    BILITOTAL 0.8 04/11/2024     OTHER:   Lab Results   Component Value Date    A1C 6.0 (H) 02/08/2024    RAQUEL 10.1 04/11/2024       Anesthesia Plan    ASA Status:  3      NPO Status: NPO Appropriate   Anesthesia Type: MAC.   Techniques and Equipment:       - Monitoring Plan: standard ASA monitoring     Consents    Anesthesia Plan(s) and associated risks, benefits, and realistic alternatives discussed. Questions answered and patient/representative(s) expressed understanding.     - Discussed: CRNA     - Discussed with:  Patient        - Pt is DNR/DNI Status: no DNR          Postoperative Care    Pain management: non-narcotic analgesics, multimodal analgesia.     Comments:                   Gera Lane MD    I have reviewed the pertinent notes and labs in the chart from the past 30 days and (re)examined the patient.  Any updates or changes from those notes are reflected in this note.    Clinically Significant Risk Factors Present on Admission                                              "

## 2025-07-31 ENCOUNTER — HOSPITAL ENCOUNTER (OUTPATIENT)
Facility: CLINIC | Age: 65
Discharge: HOME | End: 2025-07-31
Attending: INTERNAL MEDICINE | Admitting: INTERNAL MEDICINE
Payer: MEDICARE

## 2025-07-31 ENCOUNTER — ANESTHESIA (OUTPATIENT)
Dept: GASTROENTEROLOGY | Facility: CLINIC | Age: 65
End: 2025-07-31
Payer: MEDICARE

## 2025-07-31 VITALS
TEMPERATURE: 97.6 F | OXYGEN SATURATION: 98 % | SYSTOLIC BLOOD PRESSURE: 117 MMHG | HEART RATE: 67 BPM | RESPIRATION RATE: 18 BRPM | DIASTOLIC BLOOD PRESSURE: 63 MMHG

## 2025-07-31 LAB
POTASSIUM SERPL-SCNC: 4.1 MMOL/L (ref 3.4–5.3)
UPPER GI ENDOSCOPY: NORMAL

## 2025-07-31 PROCEDURE — 258N000003 HC RX IP 258 OP 636: Performed by: REGISTERED NURSE

## 2025-07-31 PROCEDURE — 43235 EGD DIAGNOSTIC BRUSH WASH: CPT | Performed by: INTERNAL MEDICINE

## 2025-07-31 PROCEDURE — 250N000011 HC RX IP 250 OP 636: Performed by: REGISTERED NURSE

## 2025-07-31 PROCEDURE — 250N000009 HC RX 250: Performed by: REGISTERED NURSE

## 2025-07-31 PROCEDURE — 84132 ASSAY OF SERUM POTASSIUM: CPT | Performed by: STUDENT IN AN ORGANIZED HEALTH CARE EDUCATION/TRAINING PROGRAM

## 2025-07-31 PROCEDURE — 370N000017 HC ANESTHESIA TECHNICAL FEE, PER MIN: Performed by: INTERNAL MEDICINE

## 2025-07-31 PROCEDURE — 36415 COLL VENOUS BLD VENIPUNCTURE: CPT | Performed by: STUDENT IN AN ORGANIZED HEALTH CARE EDUCATION/TRAINING PROGRAM

## 2025-07-31 RX ORDER — OXYCODONE HYDROCHLORIDE 10 MG/1
10 TABLET ORAL
Status: DISCONTINUED | OUTPATIENT
Start: 2025-07-31 | End: 2025-07-31 | Stop reason: HOSPADM

## 2025-07-31 RX ORDER — DEXAMETHASONE SODIUM PHOSPHATE 4 MG/ML
4 INJECTION, SOLUTION INTRA-ARTICULAR; INTRALESIONAL; INTRAMUSCULAR; INTRAVENOUS; SOFT TISSUE
Status: DISCONTINUED | OUTPATIENT
Start: 2025-07-31 | End: 2025-07-31 | Stop reason: HOSPADM

## 2025-07-31 RX ORDER — PROPOFOL 10 MG/ML
INJECTION, EMULSION INTRAVENOUS PRN
Status: DISCONTINUED | OUTPATIENT
Start: 2025-07-31 | End: 2025-07-31

## 2025-07-31 RX ORDER — SODIUM CHLORIDE, SODIUM LACTATE, POTASSIUM CHLORIDE, CALCIUM CHLORIDE 600; 310; 30; 20 MG/100ML; MG/100ML; MG/100ML; MG/100ML
INJECTION, SOLUTION INTRAVENOUS CONTINUOUS
Status: DISCONTINUED | OUTPATIENT
Start: 2025-07-31 | End: 2025-07-31 | Stop reason: HOSPADM

## 2025-07-31 RX ORDER — NALOXONE HYDROCHLORIDE 0.4 MG/ML
0.1 INJECTION, SOLUTION INTRAMUSCULAR; INTRAVENOUS; SUBCUTANEOUS
Status: DISCONTINUED | OUTPATIENT
Start: 2025-07-31 | End: 2025-07-31 | Stop reason: HOSPADM

## 2025-07-31 RX ORDER — LIDOCAINE HYDROCHLORIDE 20 MG/ML
INJECTION, SOLUTION INFILTRATION; PERINEURAL PRN
Status: DISCONTINUED | OUTPATIENT
Start: 2025-07-31 | End: 2025-07-31

## 2025-07-31 RX ORDER — OXYCODONE HYDROCHLORIDE 5 MG/1
5 TABLET ORAL
Status: DISCONTINUED | OUTPATIENT
Start: 2025-07-31 | End: 2025-07-31 | Stop reason: HOSPADM

## 2025-07-31 RX ORDER — ACETAMINOPHEN 325 MG/1
975 TABLET ORAL ONCE
Status: DISCONTINUED | OUTPATIENT
Start: 2025-07-31 | End: 2025-07-31 | Stop reason: HOSPADM

## 2025-07-31 RX ORDER — ONDANSETRON 2 MG/ML
4 INJECTION INTRAMUSCULAR; INTRAVENOUS EVERY 30 MIN PRN
Status: DISCONTINUED | OUTPATIENT
Start: 2025-07-31 | End: 2025-07-31 | Stop reason: HOSPADM

## 2025-07-31 RX ORDER — ONDANSETRON 4 MG/1
4 TABLET, ORALLY DISINTEGRATING ORAL EVERY 30 MIN PRN
Status: DISCONTINUED | OUTPATIENT
Start: 2025-07-31 | End: 2025-07-31 | Stop reason: HOSPADM

## 2025-07-31 RX ORDER — SODIUM CHLORIDE, SODIUM LACTATE, POTASSIUM CHLORIDE, CALCIUM CHLORIDE 600; 310; 30; 20 MG/100ML; MG/100ML; MG/100ML; MG/100ML
INJECTION, SOLUTION INTRAVENOUS CONTINUOUS PRN
Status: DISCONTINUED | OUTPATIENT
Start: 2025-07-31 | End: 2025-07-31

## 2025-07-31 RX ORDER — LIDOCAINE 40 MG/G
CREAM TOPICAL
Status: DISCONTINUED | OUTPATIENT
Start: 2025-07-31 | End: 2025-07-31 | Stop reason: HOSPADM

## 2025-07-31 RX ORDER — PROPOFOL 10 MG/ML
INJECTION, EMULSION INTRAVENOUS CONTINUOUS PRN
Status: DISCONTINUED | OUTPATIENT
Start: 2025-07-31 | End: 2025-07-31

## 2025-07-31 RX ADMIN — SODIUM CHLORIDE, SODIUM LACTATE, POTASSIUM CHLORIDE, AND CALCIUM CHLORIDE: .6; .31; .03; .02 INJECTION, SOLUTION INTRAVENOUS at 10:36

## 2025-07-31 RX ADMIN — TOPICAL ANESTHETIC 1 SPRAY: 200 SPRAY DENTAL; PERIODONTAL at 10:34

## 2025-07-31 RX ADMIN — PROPOFOL 150 MCG/KG/MIN: 10 INJECTION, EMULSION INTRAVENOUS at 10:36

## 2025-07-31 RX ADMIN — LIDOCAINE HYDROCHLORIDE 100 MG: 20 INJECTION, SOLUTION INFILTRATION; PERINEURAL at 10:36

## 2025-07-31 RX ADMIN — PROPOFOL 30 MG: 10 INJECTION, EMULSION INTRAVENOUS at 10:38

## 2025-07-31 ASSESSMENT — ACTIVITIES OF DAILY LIVING (ADL)
ADLS_ACUITY_SCORE: 41

## 2025-07-31 NOTE — ANESTHESIA POSTPROCEDURE EVALUATION
Patient: Bao Keenan    Procedure: Procedure(s):  Esophagoscopy, gastroscopy, duodenoscopy (EGD), combined       Anesthesia Type:  MAC    Note:  Disposition: Outpatient   Postop Pain Control: Uneventful            Sign Out: Well controlled pain   PONV: No   Neuro/Psych: Uneventful            Sign Out: Acceptable/Baseline neuro status   Airway/Respiratory: Uneventful            Sign Out: Acceptable/Baseline resp. status   CV/Hemodynamics: Uneventful            Sign Out: Acceptable CV status; No obvious hypovolemia; No obvious fluid overload   Other NRE: NONE   DID A NON-ROUTINE EVENT OCCUR?            Last vitals:  Vitals Value Taken Time   /63 07/31/25 11:30   Temp     Pulse 67 07/31/25 11:30   Resp 18 07/31/25 11:30   SpO2 98 % 07/31/25 11:30       Electronically Signed By: Gera Lane MD  July 31, 2025  12:08 PM

## 2025-07-31 NOTE — ANESTHESIA CARE TRANSFER NOTE
Patient: Bao Keenan    Procedure: Procedure(s):  Esophagoscopy, gastroscopy, duodenoscopy (EGD), combined       Diagnosis: Bleeding esophageal varices, unspecified esophageal varices type (H) [I85.01]  Diagnosis Additional Information: No value filed.    Anesthesia Type:   MAC     Note:    Oropharynx: oropharynx clear of all foreign objects and spontaneously breathing  Level of Consciousness: awake and drowsy  Oxygen Supplementation: room air    Independent Airway: airway patency satisfactory and stable  Dentition: dentition unchanged  Vital Signs Stable: post-procedure vital signs reviewed and stable  Report to RN Given: handoff report given  Patient transferred to: Phase II    Handoff Report: Identifed the Patient, Identified the Reponsible Provider, Reviewed the pertinent medical history, Discussed the surgical course, Reviewed Intra-OP anesthesia mangement and issues during anesthesia, Set expectations for post-procedure period and Allowed opportunity for questions and acknowledgement of understanding  Vitals:  Vitals Value Taken Time   BP     Temp     Pulse     Resp     SpO2         Electronically Signed By: SARAY Frederick CRNA  July 31, 2025  10:57 AM

## 2025-07-31 NOTE — OR NURSING
Pt underwent EGD under MAC. Pt tolerated well. Pt transferred to recovery and report given to taras GARRETT.

## 2025-08-06 ENCOUNTER — TELEPHONE (OUTPATIENT)
Dept: TRANSPLANT | Facility: CLINIC | Age: 65
End: 2025-08-06
Payer: COMMERCIAL

## 2025-08-06 DIAGNOSIS — Z01.810 ENCOUNTER FOR PREOPERATIVE ASSESSMENT FOR NONCORONARY CARDIAC SURGERY: Primary | ICD-10-CM

## 2025-08-06 LAB
PROTOCOL CUTOFF: NORMAL
SA 1  COMMENTS: NORMAL
SA 1 CELL: NORMAL
SA 1 TEST METHOD: NORMAL
SA 2 CELL: NORMAL
SA 2 COMMENTS: NORMAL
SA 2 TEST METHOD: NORMAL
SA1 HI RISK ABY: NORMAL
SA1 MOD RISK ABY: NORMAL
SA2 HI RISK ABY: NORMAL
SA2 MOD RISK ABY: NORMAL
UNACCEPTABLE ANTIGENS: NORMAL
UNOS CPRA: 63

## (undated) DEVICE — KIT ENDO FIRST STEP DISINFECTANT 200ML W/POUCH EP-4

## (undated) DEVICE — NEEDLE ECHOTIP PORTOSYSTEMIC MEASUREMENT SYSTEM  ECHO-PPG

## (undated) DEVICE — PACK ENDOSCOPY GI CUSTOM UMMC

## (undated) DEVICE — SUCTION MANIFOLD NEPTUNE 2 SYS 4 PORT 0702-020-000

## (undated) DEVICE — ENDO BITE BLOCK ADULT OMNI-BLOC

## (undated) DEVICE — ENDO PROBE COVER ULTRASOUND BALLOON LATEX  MAJ-249

## (undated) DEVICE — KIT CONNECTOR FOR OLYMPUS ENDOSCOPES DEFENDO 100310

## (undated) DEVICE — ENDO TUBING CO2 SMARTCAP STERILE DISP 100145CO2EXT

## (undated) DEVICE — SOL WATER IRRIG 1000ML BOTTLE 2F7114

## (undated) RX ORDER — EPHEDRINE SULFATE 50 MG/ML
INJECTION, SOLUTION INTRAMUSCULAR; INTRAVENOUS; SUBCUTANEOUS
Status: DISPENSED
Start: 2024-05-13

## (undated) RX ORDER — FENTANYL CITRATE-0.9 % NACL/PF 10 MCG/ML
PLASTIC BAG, INJECTION (ML) INTRAVENOUS
Status: DISPENSED
Start: 2024-05-13

## (undated) RX ORDER — ONDANSETRON 2 MG/ML
INJECTION INTRAMUSCULAR; INTRAVENOUS
Status: DISPENSED
Start: 2024-05-13

## (undated) RX ORDER — FENTANYL CITRATE 50 UG/ML
INJECTION, SOLUTION INTRAMUSCULAR; INTRAVENOUS
Status: DISPENSED
Start: 2024-05-13